# Patient Record
Sex: FEMALE | Race: WHITE | NOT HISPANIC OR LATINO | Employment: UNEMPLOYED | URBAN - METROPOLITAN AREA
[De-identification: names, ages, dates, MRNs, and addresses within clinical notes are randomized per-mention and may not be internally consistent; named-entity substitution may affect disease eponyms.]

---

## 2018-05-16 ENCOUNTER — HOSPITAL ENCOUNTER (INPATIENT)
Facility: HOSPITAL | Age: 78
LOS: 7 days | Discharge: NURSING, REHAB OR LONG-TERM CARE | End: 2018-05-23
Attending: INTERNAL MEDICINE | Admitting: INTERNAL MEDICINE

## 2018-05-16 ENCOUNTER — APPOINTMENT (OUTPATIENT)
Dept: CT IMAGING | Facility: HOSPITAL | Age: 78
End: 2018-05-16

## 2018-05-16 DIAGNOSIS — R53.1 GENERALIZED WEAKNESS: ICD-10-CM

## 2018-05-16 DIAGNOSIS — I62.00 SUBDURAL BLEEDING (HCC): Primary | ICD-10-CM

## 2018-05-16 LAB
ABO GROUP BLD: NORMAL
BLD GP AB SCN SERPL QL: NEGATIVE
GLUCOSE BLDC GLUCOMTR-MCNC: 139 MG/DL (ref 70–130)
RH BLD: POSITIVE
T&S EXPIRATION DATE: NORMAL

## 2018-05-16 PROCEDURE — 82962 GLUCOSE BLOOD TEST: CPT

## 2018-05-16 PROCEDURE — 86901 BLOOD TYPING SEROLOGIC RH(D): CPT | Performed by: NURSE PRACTITIONER

## 2018-05-16 PROCEDURE — 70450 CT HEAD/BRAIN W/O DYE: CPT

## 2018-05-16 PROCEDURE — 86900 BLOOD TYPING SEROLOGIC ABO: CPT | Performed by: NURSE PRACTITIONER

## 2018-05-16 PROCEDURE — 86850 RBC ANTIBODY SCREEN: CPT | Performed by: NURSE PRACTITIONER

## 2018-05-16 PROCEDURE — 99222 1ST HOSP IP/OBS MODERATE 55: CPT | Performed by: PSYCHIATRY & NEUROLOGY

## 2018-05-16 PROCEDURE — 99222 1ST HOSP IP/OBS MODERATE 55: CPT | Performed by: NURSE PRACTITIONER

## 2018-05-16 PROCEDURE — 25010000002 LEVETIRACETAM IN NACL 0.82% 500 MG/100ML SOLUTION: Performed by: PSYCHIATRY & NEUROLOGY

## 2018-05-16 RX ORDER — NICOTINE POLACRILEX 4 MG
15 LOZENGE BUCCAL
Status: DISCONTINUED | OUTPATIENT
Start: 2018-05-16 | End: 2018-05-23 | Stop reason: HOSPADM

## 2018-05-16 RX ORDER — DEXTROSE MONOHYDRATE 25 G/50ML
25 INJECTION, SOLUTION INTRAVENOUS
Status: DISCONTINUED | OUTPATIENT
Start: 2018-05-16 | End: 2018-05-23 | Stop reason: HOSPADM

## 2018-05-16 RX ORDER — DEXMEDETOMIDINE HYDROCHLORIDE 4 UG/ML
.2-1.5 INJECTION, SOLUTION INTRAVENOUS
Status: DISCONTINUED | OUTPATIENT
Start: 2018-05-16 | End: 2018-05-17

## 2018-05-16 RX ORDER — LEVETIRACETAM 5 MG/ML
500 INJECTION INTRAVASCULAR EVERY 12 HOURS SCHEDULED
Status: DISCONTINUED | OUTPATIENT
Start: 2018-05-16 | End: 2018-05-18

## 2018-05-16 RX ORDER — SODIUM CHLORIDE 9 MG/ML
75 INJECTION, SOLUTION INTRAVENOUS CONTINUOUS
Status: DISCONTINUED | OUTPATIENT
Start: 2018-05-16 | End: 2018-05-18

## 2018-05-16 RX ORDER — SODIUM CHLORIDE 0.9 % (FLUSH) 0.9 %
1-10 SYRINGE (ML) INJECTION AS NEEDED
Status: DISCONTINUED | OUTPATIENT
Start: 2018-05-16 | End: 2018-05-23 | Stop reason: HOSPADM

## 2018-05-16 RX ADMIN — DEXMEDETOMIDINE HYDROCHLORIDE 0.2 MCG/KG/HR: 100 INJECTION, SOLUTION INTRAVENOUS at 16:06

## 2018-05-16 RX ADMIN — SODIUM CHLORIDE 75 ML/HR: 9 INJECTION, SOLUTION INTRAVENOUS at 15:10

## 2018-05-16 RX ADMIN — LEVETIRACETAM 500 MG: 5 INJECTION INTRAVENOUS at 15:34

## 2018-05-16 NOTE — CONSULTS
Encounter Date: 5/16/2018    CHIEF COMPLAINT: Altered mental status and fall; CT head in outside hospital shows left subdural hematoma with midline shift and also left occipital hemorrhagic stroke.    There is no problem list on file for this patient.      PRESENT ILLNESS:    Portions of this notes is copied from previous physician encounters, reviewed and edited appropriately.    Background:     Angela Carney is a 78 y.o. female with previous history of dementia living in a nursing home, who had a fall on April 24 and had a normal head CT at the time, now admitted with 1 unwitnessed fall in the nursing home and was admitted to [facility].  The CT head done there showed left subdural hematoma with the moderate midline shift.  Also there was a new left occipital hemorrhage concerning for subacute bleed.  Patient has been transferred here for neurosurgical support, Altered mental status and fall; CT head in outside hospital shows left subdural hematoma with midline shift and also left occipital hemorrhagic stroke.    Patient examined by the bedside: Lethargic, poor historian and does not follow commands.    Review of systems   Cannot review systems because of altered mental status    No past medical history on file.    No past surgical history on file.    No current facility-administered medications for this encounter.        Allergies not on file    Social History     Social History   • Marital status: Single     Spouse name: N/A   • Number of children: N/A   • Years of education: N/A     Occupational History   • Not on file.     Social History Main Topics   • Smoking status: Not on file   • Smokeless tobacco: Not on file   • Alcohol use Not on file   • Drug use: Unknown   • Sexual activity: Not on file     Other Topics Concern   • Not on file     Social History Narrative   • No narrative on file       No family history on file.    No family status information on file.       No current facility-administered  medications on file prior to encounter.      No current outpatient prescriptions on file prior to encounter.           PHYSICAL EXAMINATION:    Vitals: No data found.         General:  pleasant in no acute distress.  HEENT: No pallor or icterus. Neck supple. No Carotid bruits.  Heart: S1 and S2 normal with regular rhythm.  No murmur.       GENERAL NEUROLOGIC EXAM     Mental Status: Lethargic and does not follow commands nonverbal , mumbles incomprehensible sounds for pain   Memory, fund of knowledge, concentration and language cannot be assessed as patient does not participate     Cranial nerves:  pupils 3 mm bilateral no eye deviation no obvious facial asymmetry , most of the cranial could not be examined as patient does not participate    Motor:  withdraws to pain bilaterally upper and lower extremity has antigravity in both upper and lower extremity on either side      Sensation:  withdraws to pain bilaterally upper and lower extremity    Reflexes: 2+ bilaterally symmetrical with down going toes.    Coordination:  and gait cannot tested as patient does not participate      Labs:     No results found for: HGB, HCT, WBC, PLT, EOSPCT, MONOPCT  No results found for: BUN, CREAT, CALCIUM, NA, K, CL, CO2, GLU  No results found for: ALT, AST, BILITOT, BILIDIR  No results found for: PHOS, MG, PROTIME, INR, APTT  No components found for: POCGLUC  No components found for: A1C  No results found for: HDL, LDL  No components found for: B12  No results found for: TSH    Lab Results (last 24 hours)     Procedure Component Value Units Date/Time    POC Glucose Once [[id number]]  (Abnormal) Collected:  05/16/18 1232    Specimen:  Blood Updated:  05/16/18 1254     Glucose 139 (H) mg/dL     Narrative:       Meter: [id number] : [id number] Kemal Cohen RN          .  Imaging Results (last 24 hours)     ** No results found for the last 24 hours. **          For this problem, the following was ordered:  Orders Placed This Encounter    Procedures   • Inpatient Neurosurgery Consult   • Inpatient Nutrition Consult   • Inpatient Case Management  Consult   • POC Glucose Once       Problem List Items Addressed This Visit     None          ASSESSMENT/PLAN: This is a 78 y.o. female who has No past medical history on file. presents with Left subdural hematoma with midline shift, left occipital hemorrhagic stroke.  Patient had dual antiplatelet at home she had a CVA in the past.  She was supposed to get to platelet transfusion in the outside hospital .     1.  Left subdural hematoma with midline shift: Management as per neurosurgery    2.  Left occipital hemorrhagic stroke: Keep blood pressure less than 130/80.  - Platelet transfusion  - I personally spoke with the daughter who is the POA and explained the diagnosis and plan of care.  She lives in [place] and will be on her way when she gets a chance.  As per her wishes patient is full code, and wants aggressive measures including intervention if required.  - Keppra for seizure    3.  Dementia lives in a nursing home.    Will follow.    Thank you for allowing us to participate in the care of this patient.    Nicholas Damon MD  05/16/18  12:58 PM

## 2018-05-16 NOTE — CONSULTS
Patient name: Angela Carney  Referring Provider: Dr. Damon at [facility]  Reason for Consultation: SDH    Patient Care Team:  Ezra Krishnamurthy MD as PCP - General (Internal Medicine)    Chief complaint: fall    Subjective .     History of present illness:    Patient is a 78 y.o.  female who lives in nursing home with history of dementia and stroke. She is on Plavix and ASA. She is unable to give meaningful history. History is obtained from chart and family. She had a fall on 4/24 and was seen at [facility]. ER notes from today indicate no ICH was seen at that time and she returned to facility. She was found down by staff this morning at the facility. Her fall was unwitnessed and it is uncertain how she fell or how long she was down. She is unable to describe her symptoms. Family states that she has just recently been confused. She had a stroke in February. She has had 4 falls in the last 4 weeks per family. Uncertain circumstances of the falls.     Review of Systems  Review of Systems   Unable to perform ROS: Dementia       History  PAST MEDICAL HISTORY  No past medical history on file.    PAST SURGICAL HISTORY  No past surgical history on file.    FAMILY HISTORY  No family history on file.    SOCIAL HISTORY  Social History   Substance Use Topics   • Smoking status: Never Smoker   • Smokeless tobacco: Never Used   • Alcohol use Not on file     not   Lives in Nursing home in [place]      Allergies:  Patient has no allergy information on record.    MEDICATIONS:    Current Facility-Administered Medications:   •  dextrose (D50W) solution 25 g, 25 g, Intravenous, Q15 Min PRN, Charles Sagastume MD  •  dextrose (GLUTOSE) oral gel 15 g, 15 g, Oral, Q15 Min PRN, Charles Sagastume MD  •  glucagon (human recombinant) (GLUCAGEN DIAGNOSTIC) injection 1 mg, 1 mg, Subcutaneous, PRN, Charles Sagastume MD  •  insulin aspart (novoLOG) injection 0-14 Units, 0-14 Units, Subcutaneous, 4x Daily With Meals & Nightly, Charles Sagastume MD  •   levETIRAcetam in NaCl 0.82% (KEPPRA) IVPB 500 mg, 500 mg, Intravenous, Q12H, Nicholas Damon MD  •  sodium chloride 0.9 % flush 1-10 mL, 1-10 mL, Intravenous, PRN, Charles Sagastume MD  •  sodium chloride 0.9 % infusion, 75 mL/hr, Intravenous, Continuous, Charles Sagastume MD    Objective     Results Review:  LABS:    Admission on 05/16/2018   Component Date Value Ref Range Status   • Glucose 05/16/2018 139* 70 - 130 mg/dL Final   • Product Code 05/16/2018 [id number]   Final   • Unit Number 05/16/2018 [id number]   Final   • UNIT  ABO 05/16/2018 A   Final   • UNIT  RH 05/16/2018 POS   Final   • Dispense Status 05/16/2018 XM   Final   • Blood Type 05/16/2018 APOS   Final   • Blood Expiration Date 05/16/2018 [id number]   Final   • Blood Type Barcode 05/16/2018 6200   Final   • ABO Type 05/16/2018 A   Final   • RH type 05/16/2018 Positive   Final   • Antibody Screen 05/16/2018 Negative   Final   • T&S Expiration Date 05/16/2018 5/19/2018 11:59:59 PM   Final     CBC from [facility] shows WBC 13.1, hemoglobin 10.1, hematocrit 30.1, platelets 274.  BMP shows sodium 136 and creatinine 1.0 elevated LFTs, troponin 0.03 INR 0.9 PTT 19.7, urinalysis negative blood nitrites leukocytes    DIAGNOSTICS:  CTH- discussed with Dr. Hodgson, radiologist.  He compared with prior study at [facility] facility.  She has a left-sided frontal parietal subdural hematoma and is heterogeneous and septated likely acute on chronic findings.  Also has left occipital interpretable hemorrhage is 2 cm and left possible subdural hematoma.  She has approximately 1 cm midline shift.  Overall this study is similar as compared to the prior.    CT cervical- DDD with autofusion C6/7 and osteophyte with facet and LF hypertrophy at C5/6 causing at least moderate central stenosis. T1 and T2 mild superior endplate compression deformities.    Results Review:   I reviewed the patient's new clinical results.  I personally viewed and interpreted the  patient's CTs    Vital Signs   Heart Rate:  [81-90] 86  BP: (122-138)/(63-82) 131/73    Physical Exam:  Physical Exam   Constitutional: She appears well-developed and well-nourished.   Body mass index is 27.59 kg/m².     Eyes: EOM are normal. Pupils are equal, round, and reactive to light.   Neck: Neck supple. Normal carotid pulses present. Carotid bruit is not present.   Cardiovascular: Regular rhythm and normal heart sounds.  Tachycardia present.    No murmur heard.  Pulmonary/Chest: Effort normal and breath sounds normal.   Abdominal: Soft. Bowel sounds are normal. There is no hepatosplenomegaly.   Musculoskeletal: She exhibits edema (inessa LE mid calf down).        Cervical back: She exhibits normal range of motion, no tenderness and no bony tenderness.   Neurological: She has normal strength. Finger-nose-finger test: will not cooperate for exam.   Reflex Scores:       Tricep reflexes are 1+ on the right side and 1+ on the left side.       Bicep reflexes are 1+ on the right side and 1+ on the left side.       Brachioradialis reflexes are 1+ on the right side and 1+ on the left side.       Patellar reflexes are 1+ on the right side and 1+ on the left side.  Skin: Skin is warm and dry.   Multiple scattered bruises on face, chest, arms   Psychiatric: Her mood appears anxious. Her speech is tangential. She is agitated. Cognition and memory are impaired. She expresses inappropriate judgment. She is inattentive.   Vitals reviewed.    Neurologic Exam     Mental Status   Oriented to person.   Disoriented to place.   Disoriented to time.   Follows 1 step commands.   Attention: decreased. Concentration: decreased.   Level of consciousness: alert (but keeps eyes closed; )    Cranial Nerves     CN III, IV, VI   Pupils are equal, round, and reactive to light.  Extraocular motions are normal.   CN III: no CN III palsy  CN VI: no CN VI palsy  Nystagmus: none     CN V   Facial sensation intact.     CN VII   Facial expression  full, symmetric.     CN VIII   CN VIII normal.     CN XII   CN XII normal.   Will not cooperate for other CN tests     Motor Exam   Muscle bulk: normal    Strength   Strength 5/5 throughout.     Sensory Exam   Intact to pain throughout extremities     Gait, Coordination, and Reflexes     Gait  Gait: (not tested due to head injury)    Coordination   Finger-nose-finger test: will not cooperate for exam.    Reflexes   Right brachioradialis: 1+  Left brachioradialis: 1+  Right biceps: 1+  Left biceps: 1+  Right triceps: 1+  Left triceps: 1+  Right patellar: 1+  Left patellar: 1+  Right plantar: normal  Left plantar: normal  Right Garcia: absent  Left Garcia: absent  Right ankle clonus: absent  Left ankle clonus: absent      Assessment/Plan     Active Problems:    Subdural bleeding      PLAN: She presents from outlying facility with left sided subdural hematoma significant improvement we'll hemorrhage.  Family states she has had 4 falls in the last 4 weeks.  All are of unknown cause and are reportedly all unwitnessed.  The patient has a history of a stroke in February.  She is on aspirin and Plavix.  These are now on hold and she has been given platelets due to the hemorrhage.  She has underlying dementia and is quite confused on presentation.  She is moving all extremities.  She does follow some commands intermittently.  No facial weakness or unilateral weakness noted.  She is not myelopathic on exam, reflexes are symmetric and 1+ and no Jonathan's or clonus.  She's got some limitation of range of motion but neck is supple and no tenderness to palpation.  CT scan mentions T1 into compression deformities, the patient is not necessarily tender in these areas and there is no way to brace this.  She will remain in ICU for observation with Q1 hour neuro checks tonight.  She's been started on Keppra.  We'll repeat CT scan ahead in the morning.  Dr. Huber has spoken at length with the daughter via telephone.  We'll continue  full care at this time.  No surgical intervention warranted this time as the patient is not obviously symptomatic.    I discussed the patients findings and my recommendations with patient, family, nursing staff and Dr. johanny Salamanca, DANN  05/16/18  3:16 PM

## 2018-05-16 NOTE — H&P
Group: [facility]         H/p NOTE    Patient Identification:  Angela Carney  78 y.o.  female  1940  [id number]            Subdural hematoma with a midline shift    CC:     History of Present Illness:  78-year-old female with history of dementia recently had a fall at the nursing home in April and was discharged back with CT head showing no evidence of any bleed.  She had another fall unwitnessed at a gas station.  CT head workup at that hospital revealed a large subdural hematoma in the left hemisphere with midline shift of 7 mm from left to right.  Patient was transferred to [facility] for further monitoring.  She is sleepy but answers questions in yes or no.  She still kind of drowsy.  Denies any pain or headaches.  There were no other injuries reported in other places.  Apparently patient was on aspirin and Plavix at home as her home medications.  She had been in the nursing home because of dementia.      Review of Systems  Unable to get with the patient's present condition due to patient being lethargic  Past Medical History:  No past medical history on file.  Noted with hypertension, diabetes mellitus, recurrent UTI, old CVA  Past Surgical History:  No past surgical history on file.     Home Meds:  No prescriptions prior to admission.   Reviewed on aspirin, Ativan, fluticasone, Humalog, losartan, Macrobid, metformin, Plavix, risperidone, thiamine and eyedrops    Allergies:  Allergies not on file    Social History:   Social History     Social History   • Marital status: Single     Spouse name: N/A   • Number of children: N/A   • Years of education: N/A     Occupational History   • Not on file.     Social History Main Topics   • Smoking status: Not on file   • Smokeless tobacco: Not on file   • Alcohol use Not on file   • Drug use: Unknown   • Sexual activity: Not on file     Other Topics Concern   • Not on file     Social History Narrative   • No narrative on file       Family History:  No  family history on file.    Physical Exam:  There were no vitals taken for this visit. There is no height or weight on file to calculate BMI.      Physical Exam  Elderly female sleepy and lethargic  Able to protect airway currently  Perioperative bruising noted  Neck is supple no bruit no adenopathy  Chest clear no wheezing or rales  CVS regular rate and rhythm no murmurs or gallop  Abdomen is soft nontender no masses felt  Extremities no edema the sinuses no clubbing  CNS sleepy lethargic no focal neurological deficits currently.  No joint deformities no skin rashes    LABS:  No results found for: CALCIUM, PHOS    No results found for: CKTOTAL, CKMB, CKMBINDEX, TROPONINI, TROPONINT                              No results found for: TSH  CrCl cannot be calculated (No order found.).         Imaging: I personally visualized the images of scans/x-rays performed within last 3 days.      Assessment:  Traumatic subdural hematoma  Status post fall  History of CVA on Plavix and aspirin  Diabetes mellitus  Hypertension  Dementia      Recommendations:  Patient admitted to the ICU with neuro checks  Neurosurgery/neurology following patient  Neuro checks per protocol  IV fluids will be started  Platelets are being given per neurosurgery/neurology  Blood glucose monitoring per ICU protocol  Blood pressure monitoring and keep systolic less than 160  We'll continue to follow with neurosurgery/neurology          Charles Sagastume MD  5/16/2018  1:04 PM      Much of this encounter note is an electronic transcription/translation of spoken language to printed text using Dragon Software.

## 2018-05-16 NOTE — PLAN OF CARE
Problem: Patient Care Overview  Goal: Plan of Care Review  Outcome: Ongoing (interventions implemented as appropriate)  Patient confused throughout shift, waxing and wanning, precedex started for agitation, when turned off patient continues to move extremities and answer some questions appropriately   05/16/18 4780   Coping/Psychosocial   Plan of Care Reviewed With patient   Plan of Care Review   Progress no change   , hemodynamically stable throughout shift

## 2018-05-16 NOTE — PROGRESS NOTES
Discharge Planning Assessment   [facility]     Patient Name: Angela Carney  MRN: [id number]  Today's Date: 5/16/2018    Admit Date: 5/16/2018          Discharge Needs Assessment     Row Name 05/16/18 1528       Living Environment    Lives With facility resident    Name(s) of Who Lives With Patient pt lives at [facility], Memory Care    Current Living Arrangements residential facility    Primary Care Provided by homecare agency    Provides Primary Care For no one, unable/limited ability to care for self    Family Caregiver if Needed none    Quality of Family Relationships supportive    Able to Return to Prior Arrangements yes       Resource/Environmental Concerns    Resource/Environmental Concerns none    Transportation Concerns car, none       Transition Planning    Patient/Family Anticipates Transition to inpatient rehabilitation facility    Patient/Family Anticipated Services at Transition skilled nursing    Transportation Anticipated family or friend will provide       Discharge Needs Assessment    Readmission Within the Last 30 Days no previous admission in last 30 days    Concerns to be Addressed discharge planning    Equipment Currently Used at Home none    Anticipated Changes Related to Illness inability to care for self    Equipment Needed After Discharge walker, rolling    Outpatient/Agency/Support Group Needs homecare agency;assisted living facility    Discharge Facility/Level of Care Needs nursing facility, skilled    Offered/Gave Vendor List yes    Current Discharge Risk dependent with mobility/activities of daily living            Discharge Plan     Row Name 05/16/18 1531       Plan    Plan Undetermined    Patient/Family in Agreement with Plan yes    Plan Comments Spoke with pt's sisters Miya Manley and Amanda Magallanes at bedside.  Introduced self and explained role.  CCP contact information provided.  Face sheet verified and IMM received.  Pt lives in a Memory Care unit at [facility].  She has dementia and is oriented normally to person only.  She is ambulatory but requires assistance with all ADLs.  Call placed to [facility] at [facility] [phone] to verify level of care.  They provide no skilled care at [facility].  Pt's sisters state that pt's daughter Felicity Urbina [phone],  is her POA and she is on her way here from [place] of [place].  CCP will follow up with pt's daughter when she arrives and will follow for skilled needs at discharge.        Destination     No service coordination in this encounter.      Durable Medical Equipment     No service coordination in this encounter.      Dialysis/Infusion     No service coordination in this encounter.      Home Medical Care     No service coordination in this encounter.      Social Care     No service coordination in this encounter.                Demographic Summary     Row Name 05/16/18 1527       General Information    Admission Type inpatient    Arrived From home   [facility], [facility]    Required Notices Provided Important Message from Medicare    Referral Source admission list    Reason for Consult discharge planning    Preferred Language English     Used During This Interaction no       Contact Information    Permission Granted to Share Info With family/designee            Functional Status     Row Name 05/16/18 1528       Functional Status    Usual Activity Tolerance moderate    Current Activity Tolerance poor       Functional Status, IADL    Medications completely dependent    Meal Preparation completely dependent    Housekeeping completely dependent    Laundry completely dependent    Shopping completely dependent       Mental Status    General Appearance WDL WDL       Mental Status Summary    Recent Changes in Mental Status/Cognitive Functioning mental status       Employment/    Employment Status disabled            Psychosocial    No documentation.           Abuse/Neglect    No  documentation.           Legal    No documentation.           Substance Abuse    No documentation.           Patient Forms     Row Name 05/16/18 1535       Patient Forms    Important Message from Medicare ([facility]) Delivered    Delivered to Support person    Method of delivery In person          Sigrid Srinivasan RN

## 2018-05-17 ENCOUNTER — APPOINTMENT (OUTPATIENT)
Dept: CARDIOLOGY | Facility: HOSPITAL | Age: 78
End: 2018-05-17
Attending: INTERNAL MEDICINE

## 2018-05-17 ENCOUNTER — APPOINTMENT (OUTPATIENT)
Dept: CT IMAGING | Facility: HOSPITAL | Age: 78
End: 2018-05-17

## 2018-05-17 PROBLEM — F03.90 DEMENTIA (HCC): Status: ACTIVE | Noted: 2018-05-17

## 2018-05-17 PROBLEM — S06.89AA INTRACRANIAL HEMATOMA (HCC): Status: ACTIVE | Noted: 2018-05-17

## 2018-05-17 PROBLEM — R29.6 FREQUENT FALLS: Status: ACTIVE | Noted: 2018-05-17

## 2018-05-17 LAB
ABO + RH BLD: NORMAL
ALBUMIN SERPL-MCNC: 3.3 G/DL (ref 3.5–5.2)
ALBUMIN/GLOB SERPL: 1.3 G/DL
ALP SERPL-CCNC: 85 U/L (ref 39–117)
ALT SERPL W P-5'-P-CCNC: 70 U/L (ref 1–33)
ANION GAP SERPL CALCULATED.3IONS-SCNC: 13.7 MMOL/L
APTT PPP: 25.7 SECONDS (ref 22.7–35.4)
AST SERPL-CCNC: 35 U/L (ref 1–32)
BH BB BLOOD EXPIRATION DATE: NORMAL
BH BB BLOOD TYPE BARCODE: 6200
BH BB DISPENSE STATUS: NORMAL
BH BB PRODUCT CODE: NORMAL
BH BB UNIT NUMBER: NORMAL
BH CV LOW VAS LEFT GASTRONEMIUS VESSEL: 1
BH CV LOW VAS RIGHT PERONEAL VESSEL: 1
BH CV LOWER VASCULAR LEFT COMMON FEMORAL AUGMENT: NORMAL
BH CV LOWER VASCULAR LEFT COMMON FEMORAL COMPETENT: NORMAL
BH CV LOWER VASCULAR LEFT COMMON FEMORAL COMPRESS: NORMAL
BH CV LOWER VASCULAR LEFT COMMON FEMORAL PHASIC: NORMAL
BH CV LOWER VASCULAR LEFT COMMON FEMORAL SPONT: NORMAL
BH CV LOWER VASCULAR LEFT DISTAL FEMORAL COMPRESS: NORMAL
BH CV LOWER VASCULAR LEFT GASTRONEMIUS COMPRESS: NORMAL
BH CV LOWER VASCULAR LEFT GASTRONEMIUS THROMBUS: NORMAL
BH CV LOWER VASCULAR LEFT GREATER SAPH AK COMPRESS: NORMAL
BH CV LOWER VASCULAR LEFT GREATER SAPH BK COMPRESS: NORMAL
BH CV LOWER VASCULAR LEFT LESSER SAPH COMPRESS: NORMAL
BH CV LOWER VASCULAR LEFT MID FEMORAL AUGMENT: NORMAL
BH CV LOWER VASCULAR LEFT MID FEMORAL COMPETENT: NORMAL
BH CV LOWER VASCULAR LEFT MID FEMORAL COMPRESS: NORMAL
BH CV LOWER VASCULAR LEFT MID FEMORAL PHASIC: NORMAL
BH CV LOWER VASCULAR LEFT MID FEMORAL SPONT: NORMAL
BH CV LOWER VASCULAR LEFT PERONEAL COMPRESS: NORMAL
BH CV LOWER VASCULAR LEFT POPLITEAL AUGMENT: NORMAL
BH CV LOWER VASCULAR LEFT POPLITEAL COMPETENT: NORMAL
BH CV LOWER VASCULAR LEFT POPLITEAL COMPRESS: NORMAL
BH CV LOWER VASCULAR LEFT POPLITEAL PHASIC: NORMAL
BH CV LOWER VASCULAR LEFT POPLITEAL SPONT: NORMAL
BH CV LOWER VASCULAR LEFT POSTERIOR TIBIAL COMPRESS: NORMAL
BH CV LOWER VASCULAR LEFT PROXIMAL FEMORAL COMPRESS: NORMAL
BH CV LOWER VASCULAR LEFT SAPHENOFEMORAL JUNCTION COMPRESS: NORMAL
BH CV LOWER VASCULAR LEFT SAPHENOFEMORAL JUNCTION PHASIC: NORMAL
BH CV LOWER VASCULAR LEFT SAPHENOFEMORAL JUNCTION SPONT: NORMAL
BH CV LOWER VASCULAR RIGHT COMMON FEMORAL AUGMENT: NORMAL
BH CV LOWER VASCULAR RIGHT COMMON FEMORAL COMPETENT: NORMAL
BH CV LOWER VASCULAR RIGHT COMMON FEMORAL COMPRESS: NORMAL
BH CV LOWER VASCULAR RIGHT COMMON FEMORAL PHASIC: NORMAL
BH CV LOWER VASCULAR RIGHT COMMON FEMORAL SPONT: NORMAL
BH CV LOWER VASCULAR RIGHT DISTAL FEMORAL COMPRESS: NORMAL
BH CV LOWER VASCULAR RIGHT GASTRONEMIUS COMPRESS: NORMAL
BH CV LOWER VASCULAR RIGHT GREATER SAPH AK COMPRESS: NORMAL
BH CV LOWER VASCULAR RIGHT GREATER SAPH BK COMPRESS: NORMAL
BH CV LOWER VASCULAR RIGHT LESSER SAPH COMPRESS: NORMAL
BH CV LOWER VASCULAR RIGHT MID FEMORAL AUGMENT: NORMAL
BH CV LOWER VASCULAR RIGHT MID FEMORAL COMPETENT: NORMAL
BH CV LOWER VASCULAR RIGHT MID FEMORAL COMPRESS: NORMAL
BH CV LOWER VASCULAR RIGHT MID FEMORAL PHASIC: NORMAL
BH CV LOWER VASCULAR RIGHT MID FEMORAL SPONT: NORMAL
BH CV LOWER VASCULAR RIGHT PERONEAL COMPRESS: NORMAL
BH CV LOWER VASCULAR RIGHT PERONEAL THROMBUS: NORMAL
BH CV LOWER VASCULAR RIGHT POPLITEAL AUGMENT: NORMAL
BH CV LOWER VASCULAR RIGHT POPLITEAL COMPETENT: NORMAL
BH CV LOWER VASCULAR RIGHT POPLITEAL COMPRESS: NORMAL
BH CV LOWER VASCULAR RIGHT POPLITEAL PHASIC: NORMAL
BH CV LOWER VASCULAR RIGHT POPLITEAL SPONT: NORMAL
BH CV LOWER VASCULAR RIGHT POSTERIOR TIBIAL COMPRESS: NORMAL
BH CV LOWER VASCULAR RIGHT PROXIMAL FEMORAL COMPRESS: NORMAL
BH CV LOWER VASCULAR RIGHT SAPHENOFEMORAL JUNCTION COMPRESS: NORMAL
BH CV LOWER VASCULAR RIGHT SAPHENOFEMORAL JUNCTION PHASIC: NORMAL
BH CV LOWER VASCULAR RIGHT SAPHENOFEMORAL JUNCTION SPONT: NORMAL
BILIRUB SERPL-MCNC: 0.5 MG/DL (ref 0.1–1.2)
BUN BLD-MCNC: 25 MG/DL (ref 8–23)
BUN/CREAT SERPL: 23.4 (ref 7–25)
CALCIUM SPEC-SCNC: 9.1 MG/DL (ref 8.6–10.5)
CHLORIDE SERPL-SCNC: 102 MMOL/L (ref 98–107)
CO2 SERPL-SCNC: 23.3 MMOL/L (ref 22–29)
CREAT BLD-MCNC: 1.07 MG/DL (ref 0.57–1)
DEPRECATED RDW RBC AUTO: 50.2 FL (ref 37–54)
ERYTHROCYTE [DISTWIDTH] IN BLOOD BY AUTOMATED COUNT: 13.3 % (ref 11.7–13)
GFR SERPL CREATININE-BSD FRML MDRD: 50 ML/MIN/1.73
GLOBULIN UR ELPH-MCNC: 2.6 GM/DL
GLUCOSE BLD-MCNC: 178 MG/DL (ref 65–99)
GLUCOSE BLDC GLUCOMTR-MCNC: 135 MG/DL (ref 70–130)
GLUCOSE BLDC GLUCOMTR-MCNC: 160 MG/DL (ref 70–130)
GLUCOSE BLDC GLUCOMTR-MCNC: 177 MG/DL (ref 70–130)
HCT VFR BLD AUTO: 28.8 % (ref 35.6–45.5)
HGB BLD-MCNC: 9.2 G/DL (ref 11.9–15.5)
INR PPP: 1.02 (ref 0.9–1.1)
MCH RBC QN AUTO: 33.3 PG (ref 26.9–32)
MCHC RBC AUTO-ENTMCNC: 31.9 G/DL (ref 32.4–36.3)
MCV RBC AUTO: 104.3 FL (ref 80.5–98.2)
PLATELET # BLD AUTO: 264 10*3/MM3 (ref 140–500)
PMV BLD AUTO: 10.2 FL (ref 6–12)
POTASSIUM BLD-SCNC: 5.3 MMOL/L (ref 3.5–5.2)
PROT SERPL-MCNC: 5.9 G/DL (ref 6–8.5)
PROTHROMBIN TIME: 13.2 SECONDS (ref 11.7–14.2)
RBC # BLD AUTO: 2.76 10*6/MM3 (ref 3.9–5.2)
SODIUM BLD-SCNC: 139 MMOL/L (ref 136–145)
UNIT  ABO: NORMAL
UNIT  RH: NORMAL
WBC NRBC COR # BLD: 6.93 10*3/MM3 (ref 4.5–10.7)

## 2018-05-17 PROCEDURE — 82962 GLUCOSE BLOOD TEST: CPT

## 2018-05-17 PROCEDURE — 85027 COMPLETE CBC AUTOMATED: CPT | Performed by: INTERNAL MEDICINE

## 2018-05-17 PROCEDURE — 85730 THROMBOPLASTIN TIME PARTIAL: CPT | Performed by: INTERNAL MEDICINE

## 2018-05-17 PROCEDURE — 85610 PROTHROMBIN TIME: CPT | Performed by: INTERNAL MEDICINE

## 2018-05-17 PROCEDURE — 92610 EVALUATE SWALLOWING FUNCTION: CPT

## 2018-05-17 PROCEDURE — 97162 PT EVAL MOD COMPLEX 30 MIN: CPT

## 2018-05-17 PROCEDURE — 25010000002 LORAZEPAM PER 2 MG: Performed by: INTERNAL MEDICINE

## 2018-05-17 PROCEDURE — 93970 EXTREMITY STUDY: CPT

## 2018-05-17 PROCEDURE — 99232 SBSQ HOSP IP/OBS MODERATE 35: CPT | Performed by: NURSE PRACTITIONER

## 2018-05-17 PROCEDURE — 70450 CT HEAD/BRAIN W/O DYE: CPT

## 2018-05-17 PROCEDURE — 97110 THERAPEUTIC EXERCISES: CPT

## 2018-05-17 PROCEDURE — 80053 COMPREHEN METABOLIC PANEL: CPT | Performed by: INTERNAL MEDICINE

## 2018-05-17 PROCEDURE — 25010000002 LEVETIRACETAM IN NACL 0.82% 500 MG/100ML SOLUTION: Performed by: PSYCHIATRY & NEUROLOGY

## 2018-05-17 PROCEDURE — 63710000001 INSULIN ASPART PER 5 UNITS: Performed by: INTERNAL MEDICINE

## 2018-05-17 PROCEDURE — 99233 SBSQ HOSP IP/OBS HIGH 50: CPT | Performed by: NURSE PRACTITIONER

## 2018-05-17 RX ORDER — FOLIC ACID 1 MG/1
1 TABLET ORAL DAILY
Status: DISCONTINUED | OUTPATIENT
Start: 2018-05-17 | End: 2018-05-23 | Stop reason: HOSPADM

## 2018-05-17 RX ORDER — RISPERIDONE 1 MG/1
1 TABLET ORAL 3 TIMES DAILY
COMMUNITY
End: 2018-05-23 | Stop reason: HOSPADM

## 2018-05-17 RX ORDER — CLOPIDOGREL BISULFATE 75 MG/1
75 TABLET ORAL DAILY
COMMUNITY
End: 2018-05-23 | Stop reason: HOSPADM

## 2018-05-17 RX ORDER — QUETIAPINE FUMARATE 25 MG/1
25 TABLET, FILM COATED ORAL EVERY 12 HOURS SCHEDULED
Status: DISCONTINUED | OUTPATIENT
Start: 2018-05-17 | End: 2018-05-23 | Stop reason: HOSPADM

## 2018-05-17 RX ORDER — LORAZEPAM 0.5 MG/1
0.5 TABLET ORAL 3 TIMES DAILY
Status: ON HOLD | COMMUNITY
End: 2018-05-23

## 2018-05-17 RX ORDER — THIAMINE MONONITRATE (VIT B1) 100 MG
100 TABLET ORAL 3 TIMES DAILY
COMMUNITY

## 2018-05-17 RX ORDER — DOCUSATE SODIUM 100 MG/1
100 CAPSULE, LIQUID FILLED ORAL 2 TIMES DAILY
COMMUNITY

## 2018-05-17 RX ORDER — TIMOLOL MALEATE 5 MG/ML
1 SOLUTION/ DROPS OPHTHALMIC 2 TIMES DAILY
COMMUNITY

## 2018-05-17 RX ORDER — LOSARTAN POTASSIUM 50 MG/1
50 TABLET ORAL DAILY
COMMUNITY

## 2018-05-17 RX ORDER — LATANOPROST 50 UG/ML
1 SOLUTION/ DROPS OPHTHALMIC NIGHTLY
COMMUNITY

## 2018-05-17 RX ORDER — CRANBERRY FRUIT EXTRACT 250 MG
1 CAPSULE ORAL DAILY
COMMUNITY

## 2018-05-17 RX ORDER — FOLIC ACID 1 MG/1
1 TABLET ORAL DAILY
COMMUNITY

## 2018-05-17 RX ORDER — ASPIRIN 81 MG/1
81 TABLET, CHEWABLE ORAL DAILY
COMMUNITY
End: 2018-05-23 | Stop reason: HOSPADM

## 2018-05-17 RX ORDER — INSULIN GLARGINE 100 [IU]/ML
10 INJECTION, SOLUTION SUBCUTANEOUS
COMMUNITY

## 2018-05-17 RX ORDER — NITROFURANTOIN 25; 75 MG/1; MG/1
100 CAPSULE ORAL 2 TIMES DAILY
COMMUNITY
Start: 2018-05-14 | End: 2018-05-23 | Stop reason: HOSPADM

## 2018-05-17 RX ORDER — INSULIN GLARGINE 100 [IU]/ML
20 INJECTION, SOLUTION SUBCUTANEOUS EVERY MORNING
COMMUNITY

## 2018-05-17 RX ORDER — LORAZEPAM 2 MG/ML
1 INJECTION INTRAMUSCULAR EVERY 4 HOURS PRN
Status: DISCONTINUED | OUTPATIENT
Start: 2018-05-17 | End: 2018-05-23 | Stop reason: HOSPADM

## 2018-05-17 RX ORDER — LABETALOL HYDROCHLORIDE 5 MG/ML
20 INJECTION, SOLUTION INTRAVENOUS EVERY 8 HOURS PRN
Status: DISCONTINUED | OUTPATIENT
Start: 2018-05-17 | End: 2018-05-23 | Stop reason: HOSPADM

## 2018-05-17 RX ORDER — AMMONIUM LACTATE 120 MG/G
1 CREAM TOPICAL 2 TIMES DAILY
COMMUNITY

## 2018-05-17 RX ORDER — POLYETHYLENE GLYCOL 3350 17 G/17G
17 POWDER, FOR SOLUTION ORAL DAILY
COMMUNITY

## 2018-05-17 RX ADMIN — LEVETIRACETAM 500 MG: 5 INJECTION INTRAVENOUS at 22:18

## 2018-05-17 RX ADMIN — LORAZEPAM 1 MG: 2 INJECTION INTRAMUSCULAR; INTRAVENOUS at 14:46

## 2018-05-17 RX ADMIN — LEVETIRACETAM 500 MG: 5 INJECTION INTRAVENOUS at 08:36

## 2018-05-17 RX ADMIN — INSULIN ASPART 3 UNITS: 100 INJECTION, SOLUTION INTRAVENOUS; SUBCUTANEOUS at 08:36

## 2018-05-17 NOTE — NURSING NOTE
Attempted to page Dr Echeverria through service and overhead to report patient positive for calf andreas thrombosis and to request PRN for BP. Unable to obtain response. Passed on need for BP PRN and report of calf vein thrombosis to Rosanna ([facility] RN).

## 2018-05-17 NOTE — PLAN OF CARE
Problem: Patient Care Overview  Goal: Plan of Care Review   05/17/18 2914   Coping/Psychosocial   Plan of Care Reviewed With patient;family   OTHER   Outcome Summary bedside swallow eval completed- pt displayed clinical s/s aspiration with thin, thickened, and puree consistencies- cognition neg impacts safety; Rec: NPO, reassess at bedside tomorrow

## 2018-05-17 NOTE — PROGRESS NOTES
"DOS: 2018  NAME: Angela Carney   : 1940  PCP: Ezra Krishnamurthy MD  No chief complaint on file.    Stroke    Historian: Chart and nursing     Subjective: No events overnight. Nursing reports that patient was restless yesterday and precedex was started.  Nursing is currently weaning. Per nursing neuro exams have been challenging d/t baseline dementia and patients willingess to participate.     Objective:  Vital signs: /69   Pulse 58   Temp 97.7 °F (36.5 °C)   Ht 162.6 cm (64.02\")   Wt 72.9 kg (160 lb 11.5 oz)   SpO2 97%   BMI 27.57 kg/m²       HEENT: Normocephalic, atraumatic   COR: RRR  Resp: Even and unlabored  Extremities: Equal pulses, non distal embolization    Neurological: exam limited by patients willingess to participate   MS: Arouseable.  Language appear normal. No obv. neglect. Will not follow any commands   CN: II-XII normal most of the cranial could not be examined as patient does not; no obv. Facial asymmetry. participate    Motor: Moves all 4 ext. Equally   Sensory: Unable   Coordination: Unable   Drips: Precedex     Laboratory results:  Lab Results   Component Value Date    GLUCOSE 178 (H) 2018    CALCIUM 9.1 2018     2018    K 5.3 (H) 2018    CO2 23.3 2018     2018    BUN 25 (H) 2018    CREATININE 1.07 (H) 2018    EGFRIFNONA 50 (L) 2018    BCR 23.4 2018    ANIONGAP 13.7 2018     Lab Results   Component Value Date    WBC 6.93 2018    HGB 9.2 (L) 2018    HCT 28.8 (L) 2018    .3 (H) 2018     2018         Review and interpretation of imagin2018 CT Head   IMPRESSION:  Stable appearance of multiple areas of hemorrhage including  a broad heterogeneous septated or multicompartmental subdural overlying  the left cerebral hemisphere, a smaller subdural extending inferior to  the left occipital lobe along the tentorium cerebelli, and  intraparenchymal " hemorrhage involving the left occipital lobe medially  as well as areas of subarachnoid hemorrhage involving the sylvian  fissure on the left involving the central sulcus superiorly.    05/17/2018  CT Head   IMPRESSION:  1. The very large acute left subdural has diminished fairly  significantly in size with an associated significant improvement in mass  effect including reduction of midline shift now 3 mm.  2. Other findings are stable.          Impression: This is a 77 yo female w/ history of dementia living in a nursing home w/ report of multiple falls over he past few weeks who presented to [facility] after an unwitnessed fall. Imaging at [facility] revealed left subdural hematoma with the moderate midline shift.  as well as a new left occipital hemorrhage concerning for subacute bleed and patient was subsequently transferred to [facility] for neurosurgical support.  Repeat Imaging today revealed very large acute left subdural which has improved mass effect and reduction in midline shift when compared to 5/16 imaging. Precedex infusing d/t agitation; currently being weaned Patient is demented at baseline and not willing to participate in my exam. Per Dr. Damon's note yesterday patient appears unchanged minus the fact that she is more alert. No change in current plan will will continue to monitor progress. PT/OT/ST. CCP to assit with discharge planning.     Plan:  · Platelet transfusion (conmplete)   · Keppra 500mg q 12hrs  · Wean sedation as tolerated   · BP parameters per Neurosurgery recommendations   · Q1 hour Neurochecks     Case reviewed w/ Dr. Damon and he agrees with plan above.

## 2018-05-17 NOTE — PROGRESS NOTES
Continued Stay Note   [facility]     Patient Name: Angela Carney  MRN: [id number]  Today's Date: 5/17/2018    Admit Date: 5/16/2018          Discharge Plan     Row Name 05/17/18 0582       Plan    Plan SNF    Plan Comments Spoke again with pt's daughter Felicity and her  Fred at bedside.  Questions answered concerning insurance, skilled care, long term care and Medicaid.  They requested CCP speak with Emily Roth with [facility] [phone].  They have been working toward applying for a Medicaid Waiver for pt to help cover the cost of assisted living.  CCP called to confirm that the Waiver will not be helpful if pt goes to skilled care.  Updated family.  Plan continues to be SNF for rehab converting to long term care and Medicaid application.  Family is currently working with an [employer]  to assist with ongoing Medicaid application.  Ismael Cornejo for [employer] will speak with family regarding her facilities and pay sources.  Family is going to tour the two selected facilities at this time.    Row Name 05/17/18 1440       Plan    Plan Comments Requested copy of POA paperwork from pt's dtr.  She states it is for financial and healthcare.  She will bring a copy for the chart.              Discharge Codes    No documentation.           Sigrid Srinivasan RN

## 2018-05-17 NOTE — PROGRESS NOTES
Vascular lab bilateral lower extremity venous doppler complete, prelim new calf vein thrombosis bilaterally without extension to popliteal vein, RAMON Sheridan aware

## 2018-05-17 NOTE — PROGRESS NOTES
"  [facility] PROGRESS [facility]    PATIENT IDENTIFICATION:   Name:  Angela Carney      MRN:  [id number]     78 y.o.  female               CC:SDH      Subjective     Interval History: Since last encounter, patient has not had new complaints. She has been on Precedex overnight but currently off. No neuro changes. Uncooperative at times. Long discussion with daughter- more information regarding history. She was living at home and caring for self last fall with subtle signs of memory changes. Abruptly different in February. She was sent to [facility] for \"neurological evaluation\" by her PCP and had a CT at PCP office. They were told she had a stroke. They are unaware of any further work up. She was on Plavix in past but off for several years. She was prescribed Eliquis by PCP but family uncertain why- unaware of any cardiac issues except MVP. She was not taking medications as directed. She was hospitalized due to fall at home in March. She was at [facility]. She was then placed in memory care living situation due to her dementia. She has been moved twice due to family's displeasure with facility. She has had 4 other unwitnessed falls. They do not feel that she should return to the current living situation as she likely cannot sustain the required self care.     ROS:  RAMANA- uncooperative and confused    Objective     Vital signs in last 24 hours:  Temp:  [97.7 °F (36.5 °C)-98.9 °F (37.2 °C)] 97.7 °F (36.5 °C)  Heart Rate:  [] 63  BP: ()/(50-91) 134/91      Intake/Output this shift:  No intake/output data recorded.      Intake/Output last 3 shifts:  I/O last 3 completed shifts:  In: 1335.4 [I.V.:1335.4]  Out: -     LABS:    Results from last 7 days  Lab Units 05/17/18  0550   WBC 10*3/mm3 6.93   HEMOGLOBIN g/dL 9.2*   HEMATOCRIT % 28.8*   PLATELETS 10*3/mm3 264         Results from last 7 days  Lab Units 05/17/18  0550   SODIUM mmol/L 139   POTASSIUM mmol/L 5.3*   CHLORIDE mmol/L 102   CO2 mmol/L 23.3 "   BUN mg/dL 25*   CREATININE mg/dL 1.07*   CALCIUM mg/dL 9.1   BILIRUBIN mg/dL 0.5   ALK PHOS U/L 85   ALT (SGPT) U/L 70*   AST (SGOT) U/L 35*   GLUCOSE mg/dL 178*        IMAGING STUDIES:  CTH- significant improvement in left SDH and MLS. No change in SAH and IPH    I personally viewed and interpreted the patient's cTH.    Meds reviewed/changed: Yes    Current Facility-Administered Medications:   •  dexmedetomidine (PRECEDEX) 400 mcg/100 mL (4 mcg/mL) infusion, 0.2-1.5 mcg/kg/hr, Intravenous, Titrated, Charles Sagastume MD, Stopped at 05/17/18 0934  •  dextrose (D50W) solution 25 g, 25 g, Intravenous, Q15 Min PRN, Charles Sagastume MD  •  dextrose (GLUTOSE) oral gel 15 g, 15 g, Oral, Q15 Min PRN, Charles Sagastume MD  •  glucagon (human recombinant) (GLUCAGEN DIAGNOSTIC) injection 1 mg, 1 mg, Subcutaneous, PRN, Charles Sagastume MD  •  insulin aspart (novoLOG) injection 0-14 Units, 0-14 Units, Subcutaneous, 4x Daily With Meals & Nightly, Charles Sagastume MD, 3 Units at 05/17/18 0836  •  levETIRAcetam in NaCl 0.82% (KEPPRA) IVPB 500 mg, 500 mg, Intravenous, Q12H, Nicholas Damon MD, 500 mg at 05/17/18 0836  •  QUEtiapine (SEROquel) tablet 25 mg, 25 mg, Oral, Q12H, Charles Sagastume MD  •  sodium chloride 0.9 % flush 1-10 mL, 1-10 mL, Intravenous, PRN, Charles Sagastume MD  •  sodium chloride 0.9 % infusion, 75 mL/hr, Intravenous, Continuous, Charles Sagastume MD, Last Rate: 75 mL/hr at 05/16/18 1510, 75 mL/hr at 05/16/18 1510      Physical Exam:    General:   Resting but vocalizes when spoken to. Did briefly open eyes     spontaneously but will not follow any commands. Her speech     is clear but tangential  HEENT:    Facial bruising  Neck:    supple    CN III IV VI: Patient will not cooperate for exam  CN V: Normal facial sensation   CN VII: No obvious weakness      Motor: DUMONT spontaneously and to pain- localizes   Sensation: Normal to painful stimulus  Extremities: inessa LE edema    Assessment/Plan     ASSESSMENT:    Active Problems:    " Subdural bleeding    Intracranial hematoma    Frequent falls    Dementia      PLAN: CTH improved. Patient uncooperative but verbalizes and DUMONT equally. Family gave more information regarding recent events. I discussed with Fadia Whyte the uncertainty of the extent of \"stroke\" work up in February. Continue to hold ASA and Plavix at present. She has inessa LE edema and poor mobility- check doppler before placing SCD. OK for diet after SLP eval, TTF, and therapy consults.     I discussed the patients findings and my recommendations with family and nursing staff       LOS: 1 day       Dionne Salamanca, APRN  5/17/2018  10:29 AM    "

## 2018-05-17 NOTE — DISCHARGE PLACEMENT REQUEST
"Angela Carney (78 y.o. Female)     Date of Birth Social Security Number Address Home Phone MRN    1940  [id number] [place] [address] [phone] [id number]    Latter day Marital Status          None Single       Admission Date Admission Type Admitting Provider Attending Provider Department, Room/Bed    5/16/18 Urgent Charles Sagastume MD Sayied, Tausif, MD [facility] INTENSIVE CARE, 373/1    Discharge Date Discharge Disposition Discharge Destination                       Attending Provider:  Charles Sagastume MD    Allergies:  No Known Allergies    Isolation:  None   Infection:  None   Code Status:  FULL    Ht:  162.6 cm (64\")   Wt:  72.9 kg (160 lb 11.5 oz)    Admission Cmt:  BC MEDICARE REPL   Principal Problem:  None                Active Insurance as of 5/16/2018     Primary Coverage     Payor Plan Insurance Group Employer/Plan Group    ANTHEM MEDICARE REPLACEMENT ANTHEM MEDICARE ADVANTAGE [id number]     Payor Plan Address Payor Plan Phone Number Effective From Effective To    [address] [phone] 1/1/2017     [facility] [address]       Subscriber Name Subscriber Birth Date Member ID       ANGELA CARNEY 1940 [id number]                 Emergency Contacts      (Rel.) Home Phone Work Phone Mobile Phone    Carlitos(Daughter)Felicity (Power of ) -- -- [phone]    Pete Carney (Son) -- -- [phone]    Miya Manley (Sister) [phone] -- [phone]    Amanda Magallanes (Sister) -- -- [phone]              "

## 2018-05-17 NOTE — SIGNIFICANT NOTE
05/17/18 1119   Rehab Time/Intention   Evaluation Not Performed other (see comments)  (RN requested to try to see pt in pm. Pt lethargic and needing to rest)   Rehab Treatment   Discipline occupational therapist   Recommendation   OT - Next Appointment 05/18/18

## 2018-05-17 NOTE — PROGRESS NOTES
Continued Stay Note   [facility]     Patient Name: Angela Carney  MRN: [id number]  Today's Date: 5/17/2018    Admit Date: 5/16/2018          Discharge Plan     Row Name 05/17/18 7634       Plan    Plan Comments Requested copy of POA paperwork from pt's dtr.  She states it is for financial and healthcare.  She will bring a copy for the chart.    Row Name 05/17/18 2983       Plan    Plan SNF  -  Referrals called    Patient/Family in Agreement with Plan yes    Plan Comments Spoke with Felicity Urbina, pt's daughter and POA at bedside.  Pt will need a higher level of care at discharge.  Discussed possible options for skilled care.  Gave pt's daughter resources including the Medicare.gov website listing.  She would like referrals to [facility] in [place] and [facility] in [place].  Call placed to Ismael Cornejo for [facility] and gave referrals.  She will begin workup.              Discharge Codes    No documentation.           Sigrid Srinivasan RN

## 2018-05-17 NOTE — PLAN OF CARE
Problem: Patient Care Overview  Goal: Plan of Care Review  Outcome: Ongoing (interventions implemented as appropriate)   05/17/18 1311   Coping/Psychosocial   Plan of Care Reviewed With patient   OTHER   Outcome Summary Pt presents with impaired functional mobility and gait secondary to generalized weakness, impaired balance, and decreased activity tolerance s/p SDh and fall. Pt may benefit from skilled PT to address strength, mobility, and gait.

## 2018-05-17 NOTE — PLAN OF CARE
Problem: Fall Risk (Adult)  Goal: Identify Related Risk Factors and Signs and Symptoms   05/17/18 1707   Fall Risk (Adult)   Related Risk Factors (Fall Risk) age-related changes   Signs and Symptoms (Fall Risk) presence of risk factors     Goal: Absence of Fall   05/17/18 1707   Fall Risk (Adult)   Absence of Fall making progress toward outcome       Problem: Skin Injury Risk (Adult)  Goal: Identify Related Risk Factors and Signs and Symptoms   05/17/18 1707   Skin Injury Risk (Adult)   Related Risk Factors (Skin Injury Risk) cognitive impairment;critical care admission;mobility impaired     Goal: Skin Health and Integrity   05/17/18 1707   Skin Injury Risk (Adult)   Skin Health and Integrity making progress toward outcome       Problem: Patient Care Overview  Goal: Plan of Care Review   05/17/18 1707   Coping/Psychosocial   Plan of Care Reviewed With patient   Plan of Care Review   Progress  05/17/18 1707   Coping/Psychosocial   Plan of Care Reviewed With patient   Plan of Care Review   Progress improving   OTHER   Outcome Summary Pt overflow to [facility]park. Weaned off of Precedex early in shift. Pt restless and agitated much of the time. Attempts at reorienting, soothing, calming and medicating were met with limited success. Ultimately, pt was placed back in restraints to ensure IV access, and ability to monitor vital signs and intake/output. Neuro status otherwise stable. ST to reeval tomorrow.   improving       Problem: Intracranial Hemorrhage (NICU)  Goal: Signs and Symptoms of Listed Potential Problems Will be Absent, Minimized or Managed (Intracranial Hemorrhage)   05/17/18 1707   Goal/Outcome Evaluation   Problems Assessed (Intracranial Hemorrhage) all   Problems Present (Intracranial Hemorrh) neurologic deficit       Problem: Restraint, Nonbehavioral (Nonviolent)  Goal: Rationale and Justification   05/17/18 1707   Restraint, Nonbehavioral (Nonviolent)   Rationale and Justification prevent harm to self;prevent  line/tube removal;failure of less restrictive safety measures     Goal: Nonbehavioral (Nonviolent) Restraint: Absence of Injury/Harm   05/17/18 1707   Restraint, Nonbehavioral (Nonviolent)   Nonbehavioral (Nonviolent) Restraint: Absence of Injury/Harm met     Goal: Nonbehavioral (Nonviolent) Restraint: Achievement of Discontinuation Criteria   05/17/18 1707   Restraint, Nonbehavioral (Nonviolent)   Nonbehavioral (Nonviolent) Restraint: Achievement of Discontinuation Criteria not met     Goal: Nonbehavioral (Nonviolent) Restraint: Preservation of Dignity and Wellbeing   05/17/18 1707   Restraint, Nonbehavioral (Nonviolent)   Nonbehavioral (Nonviolent) Restraint: Preservation of Dignity and Wellbeing met

## 2018-05-17 NOTE — PROGRESS NOTES
"[facility] PULMONARY CARE         Dr Soto Sayied   LOS: 1 day   Patient Care Team:  Ezra Krishnamurthy MD as PCP - General (Internal Medicine)    Chief Complaint: Traumatic subdural hematoma with history of CVA on Plavix and aspirin    Interval History: Events noted chart reviewed.  Discussed with nursing staff.  She is currently on Precedex due to agitation.  Wakes up moans and groans and goes back to sleep.    REVIEW OF SYSTEMS:   Confused and not reliable    Ventilator/Non-Invasive Ventilation Settings     None            Vital Signs  Temp:  [97.7 °F (36.5 °C)-98.9 °F (37.2 °C)] 97.7 °F (36.5 °C)  Heart Rate:  [] 58  BP: ()/(50-89) 122/69    Intake/Output Summary (Last 24 hours) at 05/17/18 0838  Last data filed at 05/17/18 0700   Gross per 24 hour   Intake           1335.4 ml   Output                0 ml   Net           1335.4 ml     Flowsheet Rows      First Filed Value   Admission Height  162.6 cm (64\") Documented at 05/16/2018 1535   Admission Weight  72.9 kg (160 lb 11.5 oz) Documented at 05/16/2018 1535          Physical Exam:   General Appearance:    Sleepy and confused.  Wakes up and following some commands    Lungs:     Clear to auscultation,respirations regular, even and                  unlabored    Heart:    Regular rhythm and normal rate, normal S1 and S2, no            murmur, no gallop, no rub, no click   Chest Wall:    No abnormalities observed   Abdomen:     Normal bowel sounds, no masses, no organomegaly, soft        non-tender, non-distended, no guarding, no rebound                tenderness   Extremities:   Moves all extremities well, no edema, no cyanosis, no             redness     Results Review:          Results from last 7 days  Lab Units 05/17/18  0550   SODIUM mmol/L 139   POTASSIUM mmol/L 5.3*   CHLORIDE mmol/L 102   CO2 mmol/L 23.3   BUN mg/dL 25*   CREATININE mg/dL 1.07*   GLUCOSE mg/dL 178*   CALCIUM mg/dL 9.1           Results from last 7 days  Lab Units " 05/17/18  0550   WBC 10*3/mm3 6.93   HEMOGLOBIN g/dL 9.2*   HEMATOCRIT % 28.8*   PLATELETS 10*3/mm3 264                           I reviewed the patient's new clinical results.  I personally viewed and interpreted the patient's CXR        Medication Review:     insulin aspart 0-14 Units Subcutaneous 4x Daily With Meals & Nightly   levETIRAcetam 500 mg Intravenous Q12H         dexmedetomidine 0.2-1.5 mcg/kg/hr Last Rate: 0.2 mcg/kg/hr (05/17/18 0825)   sodium chloride 75 mL/hr Last Rate: 75 mL/hr (05/16/18 1510)       ASSESSMENT:   Traumatic subdural hematoma  Status post fall  Altered mental status  History of CVA on Plavix and aspirin  Diabetes mellitus  Elevated LFTs  Hypertension  Dementia    PLAN:  Neurochecks remained stable  Wean off Precedex drip as tolerated  I believe altered mental status due to above plus part of dementia  We'll start patient on some Seroquel  Patient did receive platelets yesterday  We will trend LFTs  Blood pressure monitoring keep systolic less than 160  ICU core measures  Once more awake and no surgical intervention per neurosurgery plans for diet post swallow eval  Discussed plan of care with the rounding team and nursing staff    Charles Sagastume MD  05/17/18  8:38 AM

## 2018-05-17 NOTE — PROGRESS NOTES
Continued Stay Note   [facility]     Patient Name: Angela Carney  MRN: [id number]  Today's Date: 5/17/2018    Admit Date: 5/16/2018          Discharge Plan     Row Name 05/17/18 1058       Plan    Plan SNF  -  Referrals called    Patient/Family in Agreement with Plan yes    Plan Comments Spoke with Felicity Urbina, pt's daughter and POA at bedside.  Pt will need a higher level of care at discharge.  Discussed possible options for skilled care.  Gave pt's daughter resources including the Medicare.gov website listing.  She would like referrals to [facility] in [place] and [facility] in [place].  Call placed to Ismael Cornejo for [facility] and gave referrals.  She will begin workup.              Discharge Codes    No documentation.           Sigrid Srinivasan RN

## 2018-05-17 NOTE — THERAPY EVALUATION
Acute Care - Speech Language Pathology   Swallow Initial Evaluation  [facility]     Patient Name: Angela Carney  : 1940  MRN: [id number]  Today's Date: 2018  Onset of Illness/Injury or Date of Surgery: 18            Admit Date: 2018    Visit Dx:     ICD-10-CM ICD-9-CM   1. Subdural bleeding I62.00 432.1   2. Generalized weakness R53.1 780.79     Patient Active Problem List   Diagnosis   • Subdural bleeding   • Intracranial hematoma   • Frequent falls   • Dementia     Past Medical History:   Diagnosis Date   • Dementia    • Diabetes mellitus    • Frequent UTI    • Stroke      History reviewed. No pertinent surgical history.       SWALLOW EVALUATION (last 72 hours)      SLP Adult Swallow Evaluation     Row Name 18 1140                   Rehab Evaluation    Document Type evaluation  -SL        Subjective Information no complaints  -SL        Patient Observations alert   confused  -SL        Patient/Family Observations pt talking incessantly- incoherent; very confused; could not follow any commands  -SL        Patient Effort poor  -SL        Comment confusion limits ability to participate  -SL        Symptoms Noted During/After Treatment none  -SL           General Information    Patient Profile Reviewed yes  -SL        Pertinent History Of Current Problem Admitted with falls, large SDH - L hemisphere, with midline shift, and L occipital hemorrhage stroke; Hx- dementia- Nursing home resident  -SL        Current Method of Nutrition NPO  -SL        Precautions/Limitations, Hearing WFL  -SL        Prior Level of Function-Communication cognitive-linguistic impairment   memory impairment- old CVA  -SL        Prior Level of Function-Swallowing no diet consistency restrictions  -SL        Plans/Goals Discussed with patient;family  -SL        Barriers to Rehab medically complex;previous functional deficit;cognitive status;uncooperative  -SL        Patient's Goals for Discharge patient did not  state;patient could not state  -        Family Goals for Discharge patient able to return to PO diet  -           Oral Motor and Function    Dentition Assessment natural, present and adequate  -        Secretion Management WNL/WFL  -        Mucosal Quality moist, healthy  -        Volitional Swallow delayed;weak  -SL        Volitional Cough unable to elicit  -           Oral Musculature and Cranial Nerve Assessment    Oral Motor General Assessment unable to assess  -SL        Oral Motor, Comment pt could not follow any verbal commands to compelte oral motor mvmts; voice was weak- pt could not throat clear/cough on command  -           Clinical Swallow Eval    Oral Prep Phase impaired  -SL        Oral Transit impaired  -SL        Oral Residue impaired  -SL        Pharyngeal Phase suspected pharyngeal impairment  -        Clinical Swallow Evaluation Summary Pt was resistant to placement of spon/cup- would not close lips on spoon or cup; wet voice after ice ship trials- never cleared; anterior spillage and wet voice noted after thin liquid on spoon or from cup, nectar thick liquids; poor bolus control and a-p mvmt with honey thick liq, and puree- pt resistant to taking , holdingteeth tight together- swallow triggered after delay, however laryngeal elevation appeared weak/reduced; wet voice noted with all  consistencies presented; pt talking throughout eval- placing her more at risk for aspiration as well; pt is not appropriate for po at this time due to overall weakness, swallow delay, and cognitive impairment  -           Clinical Impression    SLP Swallowing Diagnosis mod-severe;oral dysfunction;suspected pharyngeal dysfunction  -        Functional Impact risk of aspiration/pneumonia  -        Rehab Potential/Prognosis, Swallowing adequate, monitor progress closely  -        Criteria for Skilled Therapeutic Interventions Met demonstrates skilled criteria  -           Recommendations     Therapy Frequency (Swallow) PRN  -SL        Predicted Duration Therapy Intervention (Days) until discharge  -        SLP Diet Recommendation NPO  -        Recommended Diagnostics reassess via clinical swallow evaluation  -        SLP Rec. for Method of Medication Administration meds via alternate route  -        Monitor for Signs of Aspiration yes;notify SLP if any concerns  -        Anticipated Dischage Disposition unknown  -          User Key  (r) = Recorded By, (t) = Taken By, (c) = Cosigned By    Initials Name Effective Dates     Krystal Pickard MS CCC-SLP 04/13/15 -         EDUCATION  The patient has been educated in the following areas:   Dysphagia (Swallowing Impairment).    SLP Recommendation and Plan  SLP Swallowing Diagnosis: mod-severe, oral dysfunction, suspected pharyngeal dysfunction  SLP Diet Recommendation: NPO        Monitor for Signs of Aspiration: yes, notify SLP if any concerns  Recommended Diagnostics: reassess via clinical swallow evaluation  Criteria for Skilled Therapeutic Interventions Met: demonstrates skilled criteria  Anticipated Dischage Disposition: unknown  Rehab Potential/Prognosis, Swallowing: adequate, monitor progress closely  Therapy Frequency (Swallow): PRN  Predicted Duration Therapy Intervention (Days): until discharge       Plan of Care Reviewed With: patient, family  Plan of Care Review  Plan of Care Reviewed With: patient, family  Outcome Summary: bedside swallow eval completed- pt displayed clinical s/s aspiration with thin, thickened, and puree consistencies- cognition neg impacts safety; Rec:  NPO, reassess at bedside tomorrow           SLP Outcome Measures (last 72 hours)      SLP Outcome Measures     Row Name 05/17/18 1300             SLP Outcome Measures    Outcome Measure Used? Adult NOMS  -         FCM Scores    Swallowing FCM Score 1  -        User Key  (r) = Recorded By, (t) = Taken By, (c) = Cosigned By    Initials Name Effective Dates     Krystal  MS RUSTY Pickard-SLP 04/13/15 -            Time Calculation:         Time Calculation- SLP     Row Name 05/17/18 1338             Time Calculation- SLP    SLP Received On 05/17/18  -        User Key  (r) = Recorded By, (t) = Taken By, (c) = Cosigned By    Initials Name Provider Type    FLOYD Pickard MS CCC-SLP Speech and Language Pathologist          Therapy Charges for Today     Code Description Service Date Service Provider Modifiers Qty    25892440411 HC ST EVAL ORAL PHARYNG SWALLOW 4 5/17/2018 Krystal Pickard MS CCC-SLP GN 1               Krystal Pickard MS CCC-SLP  5/17/2018

## 2018-05-17 NOTE — THERAPY TREATMENT NOTE
Acute Care - Physical Therapy Treatment Note   [facility]     Patient Name: Angela Carney  : 1940  MRN: [id number]  Today's Date: 2018  Onset of Illness/Injury or Date of Surgery: 18          Admit Date: 2018    Visit Dx:    ICD-10-CM ICD-9-CM   1. Subdural bleeding I62.00 432.1   2. Generalized weakness R53.1 780.79     Patient Active Problem List   Diagnosis   • Subdural bleeding   • Intracranial hematoma   • Frequent falls   • Dementia       Therapy Treatment        Wound 18 1230 sacral spine pressure injury (Active)   Dressing Appearance open to air 2018 12:05 PM   Base pink 2018 12:05 PM   Periwound excoriated 2018 12:05 PM   Drainage Amount none 2018 12:05 PM   Care, Wound cleansed with 2018 12:05 PM   Dressing Care, Wound open to air 2018  7:45 AM               PT Rehab Goals     Row Name 18 1039             Bed Mobility Goal 1 (PT)    Activity/Assistive Device (Bed Mobility Goal 1, PT) bed mobility activities, all  -CH      Kane Level/Cues Needed (Bed Mobility Goal 1, PT) minimum assist (75% or more patient effort)  -CH      Time Frame (Bed Mobility Goal 1, PT) 1 week  -CH         Transfer Goal 1 (PT)    Activity/Assistive Device (Transfer Goal 1, PT) transfers, all  -CH      Kane Level/Cues Needed (Transfer Goal 1, PT) minimum assist (75% or more patient effort)  -CH      Time Frame (Transfer Goal 1, PT) 1 week  -CH         Gait Training Goal 1 (PT)    Activity/Assistive Device (Gait Training Goal 1, PT) gait (walking locomotion)  -CH      Kane Level (Gait Training Goal 1, PT) minimum assist (75% or more patient effort)  -CH      Distance (Gait Goal 1, PT) 50  -CH      Time Frame (Gait Training Goal 1, PT) 1 day  -        User Key  (r) = Recorded By, (t) = Taken By, (c) = Cosigned By    Initials Name Provider Type Discipline     Andressa Musa, PT Physical Therapist PT          Physical Therapy Education      Title: PT OT SLP Therapies (Done)     Topic: Physical Therapy (Done)     Point: Mobility training (Done)    Learning Progress Summary     Learner Status Readiness Method Response Comment Documented by    Patient Done Acceptance E,TB,D VU,NR   05/17/18 1311          Point: Home exercise program (Done)    Learning Progress Summary     Learner Status Readiness Method Response Comment Documented by    Patient Done Acceptance E,TB,D VU,NR   05/17/18 1311          Point: Body mechanics (Done)    Learning Progress Summary     Learner Status Readiness Method Response Comment Documented by    Patient Done Acceptance E,TB,D VU,NR   05/17/18 1311          Point: Precautions (Done)    Learning Progress Summary     Learner Status Readiness Method Response Comment Documented by    Patient Done Acceptance E,TB,D VU,NR   05/17/18 1311                      User Key     Initials Effective Dates Name Provider Type Discipline     04/03/18 -  Andressa Musa, PT Physical Therapist PT                    PT Recommendation and Plan  Anticipated Discharge Disposition (PT): skilled nursing facility (SNF)  Planned Therapy Interventions (PT Eval): balance training, bed mobility training, gait training, home exercise program, patient/family education, transfer training  Therapy Frequency (PT Clinical Impression): daily  Outcome Summary/Treatment Plan (PT)  Anticipated Discharge Disposition (PT): skilled nursing facility (SNF)  Plan of Care Reviewed With: patient  Outcome Summary: Pt presents with impaired functional mobility and gait secondary to generalized weakness, impaired balance, and decreased activity tolerance s/p SDh and fall. Pt may benefit from skilled PT to address strength, mobility, and gait.           Outcome Measures     Row Name 05/17/18 1300             How much help from another person do you currently need...    Turning from your back to your side while in flat bed without using bedrails? 2  -CH      Moving from  lying on back to sitting on the side of a flat bed without bedrails? 2  -CH      Moving to and from a bed to a chair (including a wheelchair)? 1  -CH      Standing up from a chair using your arms (e.g., wheelchair, bedside chair)? 1  -CH      Climbing 3-5 steps with a railing? 1  -CH      To walk in hospital room? 1  -CH      AM-PAC 6 Clicks Score 8  -CH         Functional Assessment    Outcome Measure Options AM-PAC 6 Clicks Basic Mobility (PT)  -        User Key  (r) = Recorded By, (t) = Taken By, (c) = Cosigned By    Initials Name Provider Type     Andressa Musa, PT Physical Therapist           Time Calculation:         PT Charges     Row Name 05/17/18 1042             Time Calculation    Start Time 1023  -      Stop Time 1039  -      Time Calculation (min) 16 min  -      PT Received On 05/17/18  -      PT - Next Appointment 05/18/18  -      PT Goal Re-Cert Due Date 05/24/18  -        User Key  (r) = Recorded By, (t) = Taken By, (c) = Cosigned By    Initials Name Provider Type     Andressa Musa, PT Physical Therapist          Therapy Charges for Today     Code Description Service Date Service Provider Modifiers Qty    99340749137 HC PT EVAL MOD COMPLEXITY 2 5/17/2018 Andressa Musa, PT GP 1    93222204494 HC PT THER PROC EA 15 MIN 5/17/2018 Andressa Musa PT GP 1    44823669696 HC PT THER SUPP EA 15 MIN 5/17/2018 Andressa Musa, PT GP 1          PT G-Codes  Outcome Measure Options: AM-PAC 6 Clicks Basic Mobility (PT)    Andressa Musa, PT  5/17/2018

## 2018-05-17 NOTE — SIGNIFICANT NOTE
05/17/18 1356   Rehab Time/Intention   Evaluation Not Performed other (see comments);patient unavailable for evaluation  (pt not appropraite this pm. Will follow)   Rehab Treatment   Discipline occupational therapist   Recommendation   OT - Next Appointment 05/18/18

## 2018-05-18 ENCOUNTER — TELEPHONE (OUTPATIENT)
Dept: NEUROSURGERY | Facility: CLINIC | Age: 78
End: 2018-05-18

## 2018-05-18 DIAGNOSIS — S06.5XAA SDH (SUBDURAL HEMATOMA) (HCC): Primary | ICD-10-CM

## 2018-05-18 LAB
ALBUMIN SERPL-MCNC: 3.2 G/DL (ref 3.5–5.2)
ALP SERPL-CCNC: 83 U/L (ref 39–117)
ALT SERPL W P-5'-P-CCNC: 56 U/L (ref 1–33)
AST SERPL-CCNC: 28 U/L (ref 1–32)
BILIRUB CONJ SERPL-MCNC: 0.2 MG/DL (ref 0–0.3)
BILIRUB INDIRECT SERPL-MCNC: 0.5 MG/DL
BILIRUB SERPL-MCNC: 0.7 MG/DL (ref 0.1–1.2)
GLUCOSE BLDC GLUCOMTR-MCNC: 167 MG/DL (ref 70–130)
GLUCOSE BLDC GLUCOMTR-MCNC: 171 MG/DL (ref 70–130)
GLUCOSE BLDC GLUCOMTR-MCNC: 171 MG/DL (ref 70–130)
GLUCOSE BLDC GLUCOMTR-MCNC: 203 MG/DL (ref 70–130)
GLUCOSE BLDC GLUCOMTR-MCNC: 218 MG/DL (ref 70–130)
PROT SERPL-MCNC: 5.9 G/DL (ref 6–8.5)

## 2018-05-18 PROCEDURE — 25010000002 LEVETIRACETAM IN NACL 0.82% 500 MG/100ML SOLUTION: Performed by: PSYCHIATRY & NEUROLOGY

## 2018-05-18 PROCEDURE — 92526 ORAL FUNCTION THERAPY: CPT | Performed by: SPEECH-LANGUAGE PATHOLOGIST

## 2018-05-18 PROCEDURE — 97110 THERAPEUTIC EXERCISES: CPT

## 2018-05-18 PROCEDURE — 99232 SBSQ HOSP IP/OBS MODERATE 35: CPT | Performed by: NURSE PRACTITIONER

## 2018-05-18 PROCEDURE — 80076 HEPATIC FUNCTION PANEL: CPT | Performed by: INTERNAL MEDICINE

## 2018-05-18 PROCEDURE — 82962 GLUCOSE BLOOD TEST: CPT

## 2018-05-18 PROCEDURE — 25010000002 LORAZEPAM PER 2 MG: Performed by: INTERNAL MEDICINE

## 2018-05-18 PROCEDURE — 63710000001 INSULIN ASPART PER 5 UNITS: Performed by: INTERNAL MEDICINE

## 2018-05-18 RX ORDER — RISPERIDONE 1 MG/1
1 TABLET ORAL 3 TIMES DAILY
Status: DISCONTINUED | OUTPATIENT
Start: 2018-05-18 | End: 2018-05-18

## 2018-05-18 RX ORDER — LEVETIRACETAM 500 MG/1
500 TABLET ORAL 2 TIMES DAILY
Status: DISCONTINUED | OUTPATIENT
Start: 2018-05-18 | End: 2018-05-19

## 2018-05-18 RX ORDER — LOSARTAN POTASSIUM 50 MG/1
50 TABLET ORAL DAILY
Status: DISCONTINUED | OUTPATIENT
Start: 2018-05-18 | End: 2018-05-23 | Stop reason: HOSPADM

## 2018-05-18 RX ORDER — THIAMINE MONONITRATE (VIT B1) 100 MG
100 TABLET ORAL DAILY
Status: DISCONTINUED | OUTPATIENT
Start: 2018-05-18 | End: 2018-05-23 | Stop reason: HOSPADM

## 2018-05-18 RX ORDER — DOCUSATE SODIUM 100 MG/1
100 CAPSULE, LIQUID FILLED ORAL DAILY
Status: DISCONTINUED | OUTPATIENT
Start: 2018-05-18 | End: 2018-05-23 | Stop reason: HOSPADM

## 2018-05-18 RX ADMIN — SODIUM CHLORIDE 75 ML/HR: 9 INJECTION, SOLUTION INTRAVENOUS at 09:17

## 2018-05-18 RX ADMIN — INSULIN ASPART 5 UNITS: 100 INJECTION, SOLUTION INTRAVENOUS; SUBCUTANEOUS at 23:22

## 2018-05-18 RX ADMIN — Medication 100 MG: at 15:07

## 2018-05-18 RX ADMIN — LOSARTAN POTASSIUM 50 MG: 50 TABLET, FILM COATED ORAL at 15:07

## 2018-05-18 RX ADMIN — INSULIN ASPART 5 UNITS: 100 INJECTION, SOLUTION INTRAVENOUS; SUBCUTANEOUS at 18:01

## 2018-05-18 RX ADMIN — LEVETIRACETAM 500 MG: 5 INJECTION INTRAVENOUS at 10:05

## 2018-05-18 RX ADMIN — QUETIAPINE FUMARATE 25 MG: 25 TABLET, FILM COATED ORAL at 12:10

## 2018-05-18 RX ADMIN — FOLIC ACID 1 MG: 1 TABLET ORAL at 12:10

## 2018-05-18 RX ADMIN — DOCUSATE SODIUM 100 MG: 100 CAPSULE, LIQUID FILLED ORAL at 14:27

## 2018-05-18 RX ADMIN — QUETIAPINE FUMARATE 25 MG: 25 TABLET, FILM COATED ORAL at 22:29

## 2018-05-18 RX ADMIN — INSULIN ASPART 3 UNITS: 100 INJECTION, SOLUTION INTRAVENOUS; SUBCUTANEOUS at 12:09

## 2018-05-18 RX ADMIN — LORAZEPAM 1 MG: 2 INJECTION INTRAMUSCULAR; INTRAVENOUS at 13:47

## 2018-05-18 RX ADMIN — LEVETIRACETAM 500 MG: 500 TABLET, FILM COATED ORAL at 22:29

## 2018-05-18 NOTE — PLAN OF CARE
Problem: Fall Risk (Adult)  Goal: Identify Related Risk Factors and Signs and Symptoms  Outcome: Ongoing (interventions implemented as appropriate)    Goal: Absence of Fall  Outcome: Ongoing (interventions implemented as appropriate)      Problem: Skin Injury Risk (Adult)  Goal: Identify Related Risk Factors and Signs and Symptoms  Outcome: Ongoing (interventions implemented as appropriate)    Goal: Skin Health and Integrity  Outcome: Ongoing (interventions implemented as appropriate)      Problem: Patient Care Overview  Goal: Plan of Care Review  Outcome: Ongoing (interventions implemented as appropriate)   05/18/18 1975   Coping/Psychosocial   Plan of Care Reviewed With patient;family   Plan of Care Review   Progress improving   OTHER   Outcome Summary Will go to rehab at d/c. Restraint free, since 1200 and doing well. Eating with assistance of one to feed. Sat up in chair this morning with family present. Will continue to monitor.

## 2018-05-18 NOTE — PLAN OF CARE
Problem: Patient Care Overview  Goal: Plan of Care Review  Outcome: Ongoing (interventions implemented as appropriate)   05/18/18 1313   Coping/Psychosocial   Plan of Care Reviewed With patient   OTHER   Outcome Summary Pt much more cooperative today. Pt took trials of thin, nectar, puree, and soft solids. Inconsistent throat clear with thin and with nectar by straw - noted with fatigue. No overt s/s aspiration with nectar by cup, puree, or soft solids. Adequate mastication and oral clearance. Timing fair with adequate laryngeal elevation. REC mechanical soft, no mixed consistencies with nectar thick liquids. No straws and upright positioning. Meds to be given in puree.

## 2018-05-18 NOTE — PLAN OF CARE
Problem: Patient Care Overview  Goal: Plan of Care Review  Outcome: Ongoing (interventions implemented as appropriate)   05/18/18 8077   Coping/Psychosocial   Plan of Care Reviewed With patient   OTHER   Outcome Summary Pt with posterior leaning in sittin initially and then in standing. She bears weight well BLE but unable to amb due to post lean today. bed to chair only with moda asst of 1.

## 2018-05-18 NOTE — SIGNIFICANT NOTE
05/18/18 1533   Rehab Time/Intention   Evaluation Not Performed patient unavailable for evaluation  (nsg procedure 1400)   Rehab Treatment   Discipline occupational therapist

## 2018-05-18 NOTE — NURSING NOTE
Called and notified Dr. Sagastume of Calf vein thrombosis bilaterally.  Ok to ambulate with PT.  Radha Caruso RN

## 2018-05-18 NOTE — THERAPY RE-EVALUATION
Acute Care - Speech Language Pathology   Swallow Re-Evaluation  [facility]     Patient Name: Angela Carney  : 1940  MRN: [id number]  Today's Date: 2018  Onset of Illness/Injury or Date of Surgery: 18            Admit Date: 2018    Visit Dx:     ICD-10-CM ICD-9-CM   1. Subdural bleeding I62.00 432.1   2. Generalized weakness R53.1 780.79     Patient Active Problem List   Diagnosis   • Subdural bleeding   • Intracranial hematoma   • Frequent falls   • Dementia     Past Medical History:   Diagnosis Date   • Dementia    • Diabetes mellitus    • Frequent UTI    • Stroke      History reviewed. No pertinent surgical history.       SWALLOW EVALUATION (last 72 hours)      SLP Adult Swallow Evaluation     Row Name 18 1130 18 1140                Rehab Evaluation    Document Type re-evaluation  -SA evaluation  -SL       Subjective Information no complaints  -SA no complaints  -SL       Patient Observations alert;cooperative  -SA alert   confused  -SL       Patient/Family Observations  -- pt talking incessantly- incoherent; very confused; could not follow any commands  -SL       Patient Effort good  -SA poor  -SL       Comment  -- confusion limits ability to participate  -SL       Symptoms Noted During/After Treatment none  -SA none  -SL          General Information    Patient Profile Reviewed yes  -SA yes  -SL       Pertinent History Of Current Problem SDH, L occipital hem storke, h/o dementia, NH resident  -SA Admitted with falls, large SDH - L hemisphere, with midline shift, and L occipital hemorrhage stroke; Hx- dementia- Nursing home resident  -SL       Current Method of Nutrition NPO  -SA NPO  -SL       Precautions/Limitations, Hearing  -- WFL  -SL       Prior Level of Function-Communication  -- cognitive-linguistic impairment   memory impairment- old CVA  -SL       Prior Level of Function-Swallowing no diet consistency restrictions  -SA no diet consistency restrictions  -SL        Plans/Goals Discussed with patient;family  - patient;family  -       Barriers to Rehab medically complex;previous functional deficit  - medically complex;previous functional deficit;cognitive status;uncooperative  -       Patient's Goals for Discharge patient did not state  - patient did not state;patient could not state  -       Family Goals for Discharge family did not state  - patient able to return to PO diet  -          Pain Assessment    Additional Documentation Pain Scale: Numbers Pre/Post-Treatment (Group)  -SA  --          Pain Scale: Numbers Pre/Post-Treatment    Pain Scale: Numbers, Pretreatment 0/10 - no pain  -SA  --          Oral Motor and Function    Dentition Assessment  -- natural, present and adequate  -       Secretion Management  -- WNL/WFL  -SL       Mucosal Quality  -- moist, healthy  -SL       Volitional Swallow  -- delayed;weak  -SL       Volitional Cough  -- unable to elicit  -          Oral Musculature and Cranial Nerve Assessment    Oral Motor General Assessment  -- unable to assess  -SL       Oral Motor, Comment  -- pt could not follow any verbal commands to compelte oral motor mvmts; voice was weak- pt could not throat clear/cough on command  -          Clinical Swallow Eval    Oral Prep Phase  -- impaired  -SL       Oral Transit  -- impaired  -SL       Oral Residue  -- impaired  -SL       Pharyngeal Phase  -- suspected pharyngeal impairment  -       Clinical Swallow Evaluation Summary Pt much more cooperative today.  Pt took trials of thin, nectar, puree, and soft solids.  Inconsistent throat clear with thin and with nectar by straw - noted with fatigue.  No overt s/s aspiration with nectar by cup, puree, or soft solids.  Adequate mastication and oral clearance.  Timing fair with adequate laryngeal elevation.  REC mechanical soft, no mixed consistencies with nectar thick liquids.  No straws and upright positioning.  Meds to be given in puree.    - Pt was  resistant to placement of spon/cup- would not close lips on spoon or cup; wet voice after ice ship trials- never cleared; anterior spillage and wet voice noted after thin liquid on spoon or from cup, nectar thick liquids; poor bolus control and a-p mvmt with honey thick liq, and puree- pt resistant to taking , holdingteeth tight together- swallow triggered after delay, however laryngeal elevation appeared weak/reduced; wet voice noted with all  consistencies presented; pt talking throughout eval- placing her more at risk for aspiration as well; pt is not appropriate for po at this time due to overall weakness, swallow delay, and cognitive impairment  -SL          Clinical Impression    SLP Swallowing Diagnosis suspected pharyngeal dysfunction  -SA mod-severe;oral dysfunction;suspected pharyngeal dysfunction  -SL       Functional Impact risk of aspiration/pneumonia  -SA risk of aspiration/pneumonia  -SL       Rehab Potential/Prognosis, Swallowing good, to achieve stated therapy goals  -SA adequate, monitor progress closely  -SL       Criteria for Skilled Therapeutic Interventions Met demonstrates skilled criteria  -SA demonstrates skilled criteria  -SL          Recommendations    Therapy Frequency (Swallow) PRN  -SA PRN  -SL       Predicted Duration Therapy Intervention (Days) until discharge  -SA until discharge  -SL       SLP Diet Recommendation mechanical soft with no mixed consistencies;nectar thick liquids  -SA NPO  -SL       Recommended Diagnostics other (see comments)   re-eval for upgrade vs need for instrumental  -SA reassess via clinical swallow evaluation  -SL       Recommended Precautions and Strategies upright posture during/after eating;small bites of food and sips of liquid;no straw  -SA  --       SLP Rec. for Method of Medication Administration with pudding or applesauce;meds crushed  -SA meds via alternate route  -SL       Monitor for Signs of Aspiration cough;gurgly voice;right lower lobe  infiltrates;pneumonia  - yes;notify SLP if any concerns  -       Anticipated Dischage Disposition skilled nursing facility  - unknown  -          Swallow Goals (SLP)    Oral Nutrition/Hydration Goal Selection (SLP) oral nutrition/hydration, SLP goal 1  -  --          Oral Nutrition/Hydration Goal 1 (SLP)    Oral Nutrition/Hydration Goal 1, SLP Pt will tolerate recommended diet  -  --         User Key  (r) = Recorded By, (t) = Taken By, (c) = Cosigned By    Initials Name Effective Dates     Krystal Serena, MS CCC-SLP 03/07/18 -      Krystal Melly, MS CCC-SLP 04/13/15 -         EDUCATION  The patient has been educated in the following areas:   Dysphagia (Swallowing Impairment) Modified Diet Instruction.    SLP Recommendation and Plan  SLP Swallowing Diagnosis: suspected pharyngeal dysfunction  SLP Diet Recommendation: mechanical soft with no mixed consistencies, nectar thick liquids  Recommended Precautions and Strategies: upright posture during/after eating, small bites of food and sips of liquid, no straw     Monitor for Signs of Aspiration: cough, gurgly voice, right lower lobe infiltrates, pneumonia  Recommended Diagnostics: other (see comments) (re-eval for upgrade vs need for instrumental)  Criteria for Skilled Therapeutic Interventions Met: demonstrates skilled criteria  Anticipated Dischage Disposition: skilled nursing facility  Rehab Potential/Prognosis, Swallowing: good, to achieve stated therapy goals  Therapy Frequency (Swallow): PRN  Predicted Duration Therapy Intervention (Days): until discharge       Plan of Care Reviewed With: patient  Plan of Care Review  Plan of Care Reviewed With: patient  Outcome Summary: Pt much more cooperative today.  Pt took trials of thin, nectar, puree, and soft solids.  Inconsistent throat clear with thin and with nectar by straw - noted with fatigue.  No overt s/s aspiration with nectar by cup, puree, or soft solids.  Adequate mastication and oral clearance.   Timing fair with adequate laryngeal elevation.  REC mechanical soft, no mixed consistencies with nectar thick liquids.  No straws and upright positioning.  Meds to be given in puree.          SLP GOALS     Row Name 05/18/18 1130             Oral Nutrition/Hydration Goal 1 (SLP)    Oral Nutrition/Hydration Goal 1, SLP Pt will tolerate recommended diet  -        User Key  (r) = Recorded By, (t) = Taken By, (c) = Cosigned By    Initials Name Provider Type    SA Krystal Lyons MS CCC-SLP Speech and Language Pathologist             SLP Outcome Measures (last 72 hours)      SLP Outcome Measures     Row Name 05/18/18 1130 05/17/18 1300          SLP Outcome Measures    Outcome Measure Used? Adult NOMS  -SA Adult NOMS  -SL        FCM Scores    Swallowing FCM Score 4  - 1  -       User Key  (r) = Recorded By, (t) = Taken By, (c) = Cosigned By    Initials Name Effective Dates    SA Krystal Lyons MS CCC-SLP 03/07/18 -      Krystal Pickard MS CCC-SONALI 04/13/15 -            Time Calculation:         Time Calculation- SLP     Row Name 05/18/18 1313             Time Calculation- [facility]    SLP Start Time 1130  -      SLP Stop Time 1230  -      SLP Time Calculation (min) 60 min  -      SLP Received On 05/18/18  -        User Key  (r) = Recorded By, (t) = Taken By, (c) = Cosigned By    Initials Name Provider Type    SA Krystal Lyons MS CCC-SLP Speech and Language Pathologist          Therapy Charges for Today     Code Description Service Date Service Provider Modifiers Qty    86093343202 HC ST TREATMENT SWALLOW 4 5/18/2018 Krystal Lyons MS CCC-SLP GN 1               Krystal Lyons MS CCC-SONALI  5/18/2018

## 2018-05-18 NOTE — PROGRESS NOTES
"DOS: 2018  NAME: Angela Carney   : 1940  PCP: Ezra Krishnamurthy MD  No chief complaint on file.    Stroke    Historian: Chart and nursing     Subjective: No events overnight. Moved out of ICU to Neuromonitor floor yesterday. She remains neurologically stable. Still very restless; restraints d/c'd earlier today. She has required ativan since restraints d/c'd.   Objective:  Vital signs: /71 (BP Location: Left arm, Patient Position: Sitting)   Pulse 92   Temp 98.2 °F (36.8 °C) (Oral)   Resp 18   Ht 162.6 cm (64\")   Wt 72.9 kg (160 lb 11.5 oz)   SpO2 99%   BMI 27.59 kg/m²       HEENT: Normocephalic (ecchymosis to Left lower eye (noted yesterday as well)   COR:   Resp: Even and unlabored  Extremities: Equal pulses, non distal embolization    Neurological: exam limited by patients willingess to participate. Will not follow any commands on my exam.   MS: Arousable Language appear normal. No obv. neglect.   CN: II-XII normal most of the cranial could not be examined as patient does not; no obv. Facial asymmetry. participate    Motor: Moves all 4 ext. Equally   Sensory: Unable   Coordination: Unable   Drips: Precedex off    Laboratory results:  Lab Results   Component Value Date    GLUCOSE 178 (H) 2018    CALCIUM 9.1 2018     2018    K 5.3 (H) 2018    CO2 23.3 2018     2018    BUN 25 (H) 2018    CREATININE 1.07 (H) 2018    EGFRIFNONA 50 (L) 2018    BCR 23.4 2018    ANIONGAP 13.7 2018     Lab Results   Component Value Date    WBC 6.93 2018    HGB 9.2 (L) 2018    HCT 28.8 (L) 2018    .3 (H) 2018     2018         Review and interpretation of imagin2018 CT Head   IMPRESSION:  Stable appearance of multiple areas of hemorrhage including  a broad heterogeneous septated or multicompartmental subdural overlying  the left cerebral hemisphere, a smaller subdural extending " inferior to  the left occipital lobe along the tentorium cerebelli, and  intraparenchymal hemorrhage involving the left occipital lobe medially  as well as areas of subarachnoid hemorrhage involving the sylvian  fissure on the left involving the central sulcus superiorly.    05/17/2018  CT Head   IMPRESSION:  1. The very large acute left subdural has diminished fairly  significantly in size with an associated significant improvement in mass  effect including reduction of midline shift now 3 mm.  2. Other findings are stable.        05/17/2018  Bilateral Lower Ext. Dopplar   Interpretation Summary     · Acute right lower extremity deep vein thrombosis noted in the peroneal.  · Acute left lower extremity deep vein thrombosis noted in the gastrocnemius/soleal.  · All other veins appeared normal bilaterally.            Impression: This is a 77 yo female w/ history of dementia living in a nursing home w/ report of multiple falls over he past few weeks who presented to [facility] after an unwitnessed fall. Imaging at [place] revealed left subdural hematoma with the moderate midline shift.  as well as a new left occipital hemorrhage concerning for subacute bleed and patient was subsequently transferred to [facility] for neurosurgical support.  Repeat Imaging today revealed very large acute left subdural which has improved mass effect and reduction in midline shift when compared to 5/16 imaging.     Patient stable; transferred out of ICU yesterday.Duplex scan of bilateral lower ext. Completed yesterday revealed bilateral LE DVTs; anticoagulation contraindicated per neurosurgery. Neurologically unchanged. She is demented at baseline and not willing to participate in my exam again today . No change in current plan will will continue to monitor progress. PT/OT/ST. CCP to assit with discharge planning. No further neurology workup indicated. We will sign off and see again upon request.    Plan:  · Discontinue Risperdal   · Platelet  transfusion (complete)   · Keppra 500mg q 12hrs   · BP parameters per Neurosurgery recommendations   · Q1 hour Neurochecks   · Follow-Up in stroke clinic in 3 months    Case reviewed w/ Dr. Damon and he agrees with plan above.

## 2018-05-18 NOTE — PROGRESS NOTES
"[facility] PULMONARY CARE         Dr Soto Sayied   LOS: 2 days   Patient Care Team:  Ezra Krishnamurthy MD as PCP - General (Internal Medicine)    Chief Complaint: Traumatic subdural hematoma with history of CVA on Plavix and aspirin    Interval History more awake today and sitting up in a chair.  Family feels mental status much improved.    REVIEW OF SYSTEMS:   Awake and denies chest pain or shortness of breath.    Ventilator/Non-Invasive Ventilation Settings     None            Vital Signs  Temp:  [97.6 °F (36.4 °C)-98.7 °F (37.1 °C)] 98.2 °F (36.8 °C)  Heart Rate:  [] 92  Resp:  [18-24] 18  BP: (133-183)/(70-98) 134/71  No intake or output data in the 24 hours ending 05/18/18 1319  Flowsheet Rows      First Filed Value   Admission Height  162.6 cm (64\") Documented at 05/16/2018 1535   Admission Weight  72.9 kg (160 lb 11.5 oz) Documented at 05/16/2018 1535          Physical Exam:   General Appearance:   Awake and following commands.  HEENT bruising periorbital with bruising more prominent on the left side of the face.     Lungs:     Clear to auscultation,respirations regular, even and                  unlabored    Heart:    Regular rhythm and normal rate, normal S1 and S2, no            murmur, no gallop, no rub, no click   Chest Wall:    No abnormalities observed   Abdomen:     Normal bowel sounds, no masses, no organomegaly, soft        non-tender, non-distended, no guarding, no rebound                tenderness   Extremities:   Moves all extremities well, no edema, no cyanosis, no             redness     Results Review:          Results from last 7 days  Lab Units 05/17/18  0550   SODIUM mmol/L 139   POTASSIUM mmol/L 5.3*   CHLORIDE mmol/L 102   CO2 mmol/L 23.3   BUN mg/dL 25*   CREATININE mg/dL 1.07*   GLUCOSE mg/dL 178*   CALCIUM mg/dL 9.1           Results from last 7 days  Lab Units 05/17/18  0550   WBC 10*3/mm3 6.93   HEMOGLOBIN g/dL 9.2*   HEMATOCRIT % 28.8*   PLATELETS 10*3/mm3 264 "       Results from last 7 days  Lab Units 05/17/18  0832   INR  1.02   APTT seconds 25.7                       I reviewed the patient's new clinical results.  I personally viewed and interpreted the patient's CXR        Medication Review:     docusate sodium 100 mg Oral Daily   folic acid 1 mg Oral Daily   insulin aspart 0-14 Units Subcutaneous 4x Daily With Meals & Nightly   levETIRAcetam 500 mg Oral BID   losartan 50 mg Oral Daily   QUEtiapine 25 mg Oral Q12H   risperiDONE 1 mg Oral TID   thiamine 100 mg Oral Daily            ASSESSMENT:   Traumatic subdural hematoma  Status post fall  Altered mental status  History of CVA on Plavix and aspirin  Calf vein thrombosis  Diabetes mellitus  Elevated LFTs  Hypertension  Dementia    PLAN:  Mental status definitely improved.  Continue Seroquel  Patient did receive platelets yesterday  We will trend LFTs  Switch to oral medications  Calf vein thrombosis with contraindication to anticoagulation or antiplatelets per neurosurgery.  Possibly need to repeat Doppler in 24-48 hours  Diet per speech  Ambulate with physical therapy    Charles Sagastume MD  05/18/18  1:19 PM

## 2018-05-18 NOTE — PROGRESS NOTES
Continued Stay Note   [facility]     Patient Name: Angela Carney  MRN: [id number]  Today's Date: 5/18/2018    Admit Date: 5/16/2018          Discharge Plan     Row Name 05/18/18 1259       Plan    Plan [facility] care facility    Patient/Family in Agreement with Plan yes    Plan Comments Met with dgnikki Blevins and patient's spouse in room.  [facility] is their first choice.  Left message for Ismael Cornejo with [facility] to confirm bed availability.  Patient had sitter discontinued today at noon.                Discharge Codes    No documentation.           Chelsi Alcala RN

## 2018-05-18 NOTE — PLAN OF CARE
Problem: Fall Risk (Adult)  Goal: Absence of Fall  Outcome: Ongoing (interventions implemented as appropriate)      Problem: Skin Injury Risk (Adult)  Goal: Skin Health and Integrity  Outcome: Ongoing (interventions implemented as appropriate)      Problem: Patient Care Overview  Goal: Plan of Care Review  Outcome: Ongoing (interventions implemented as appropriate)   05/18/18 0515   Plan of Care Review   Progress no change   OTHER   Outcome Summary Pt admitted from ICU, confused, unable to follow directions, restless, in restraints due to pulling lines, vss, positive for inessa calf vein thrombosis, dr rockwell aware, mepilex to l shoulder, l elbow, and sacrum, yang area red, cream applied, turned q 2 hours, incontinent of bladder, will cont to monitor        Problem: Restraint, Nonbehavioral (Nonviolent)  Goal: Rationale and Justification  Outcome: Ongoing (interventions implemented as appropriate)    Goal: Nonbehavioral (Nonviolent) Restraint: Absence of Injury/Harm  Outcome: Ongoing (interventions implemented as appropriate)    Goal: Nonbehavioral (Nonviolent) Restraint: Achievement of Discontinuation Criteria  Outcome: Ongoing (interventions implemented as appropriate)    Goal: Nonbehavioral (Nonviolent) Restraint: Preservation of Dignity and Wellbeing  Outcome: Ongoing (interventions implemented as appropriate)

## 2018-05-18 NOTE — TELEPHONE ENCOUNTER
----- Message from DANN Sandoval sent at 5/18/2018  3:51 PM EDT -----  Regarding: f/u  Pt seen for SDH/IPH  by Dr. Alfonso Huber on May 16, 2018. Need follow up in 2 week(s)  with imaging cTH. Ok to see NP or MD- available. Likely going to long term care and maybe in [place]

## 2018-05-18 NOTE — THERAPY TREATMENT NOTE
Acute Care - Physical Therapy Treatment Note   [facility]     Patient Name: Angela Carney  : 1940  MRN: [id number]  Today's Date: 2018  Onset of Illness/Injury or Date of Surgery: 18          Admit Date: 2018    Visit Dx:    ICD-10-CM ICD-9-CM   1. Subdural bleeding I62.00 432.1   2. Generalized weakness R53.1 780.79     Patient Active Problem List   Diagnosis   • Subdural bleeding   • Intracranial hematoma   • Frequent falls   • Dementia       Therapy Treatment          Rehabilitation Treatment Summary     Row Name 18 1125             Treatment Time/Intention    Discipline physical therapist  -SV      Document Type therapy note (daily note)  -SV      Subjective Information no complaints  -SV      Patient/Family Observations none present  -SV      Total Minutes, Physical Therapy Treatment 15  -SV      Therapy Frequency (PT Clinical Impression) daily  -SV      Patient Effort adequate  -SV      Comment RN  verbal order to cont PT/mobilization with new DVT noted on chart  -SV      Existing Precautions/Restrictions --   fall risk, cognitive  -SV      Recorded by [SV] Yola Buckner, PT 18 1139      Row Name 18 1125             Vital Signs    Pre Systolic BP Rehab 155  -SV      Pre Treatment Diastolic BP 75  -SV      Posttreatment Heart Rate (beats/min) 91  -SV      Intra SpO2 (%) 90  -SV      O2 Delivery Intra Treatment room air  -SV      Post SpO2 (%) 93  -SV      O2 Delivery Post Treatment room air  -SV      Recorded by [SV] Yola Buckner, PT 18 1139      Row Name 18 1125             Bed Mobility Assessment/Treatment    Supine-Sit [place] (Bed Mobility) moderate assist (50% patient effort);maximum assist (25% patient effort);2 person assist  -SV      Sit-Supine [place] (Bed Mobility) not tested  -SV      Recorded by [SV] Yola Buckner, PT 18 1139      Row Name 18 1125             Transfer Assessment/Treatment    Transfer  Assessment/Treatment bed-chair transfer  -SV      Comment (Transfers) stood with posterior lean  mod asst of 1 four minutes HHA  -SV      Bed-Chair Pitt (Transfers) moderate assist (50% patient effort);minimum assist (75% patient effort);2 person assist;other (see comments)   posterior lean  -SV      Sit-Stand [place] (Transfers) moderate assist (50% patient effort);maximum assist (25% patient effort);1 person assist  -SV      Recorded by [SV] Yola Buckner, PT 05/18/18 1139      Row Name 05/18/18 1125             Positioning and Restraints    Pre-Treatment Position in bed  -SV      Post Treatment Position chair  -SV      In Bed call light within reach;encouraged to call for assist;exit alarm on;notified nsg;with SLP  -SV      Recorded by [SV] Yola Buckner, PT 05/18/18 1139      Row Name 05/18/18 1125             Pain Assessment    Additional Documentation --   denies, no signs  -SV      Recorded by [SV] Yola Buckner, PT 05/18/18 1139      Row Name                Wound 05/16/18 1230 sacral spine pressure injury    Wound - Properties Group Date first assessed: 05/16/18 [MC] Time first assessed: 1230 [MC] Present On Admission : yes [MC] Location: sacral spine [MC] Type: pressure injury [MC] Stage, Pressure Injury: deep tissue injury [MC] Recorded by:  [MC] Douglas Fernández RN 05/16/18 1847    Row Name                Wound 05/17/18 0745 Left lateral other (see notes) abrasion    Wound - Properties Group Date first assessed: 05/17/18 [BS] Time first assessed: 0745 [BS] Present On Admission : yes [BS] Side: Left [BS] Orientation: lateral [BS] Location: other (see notes) [BS], shoulder  Type: abrasion [BS] Recorded by:  [BS] Fatimah Storm RN 05/17/18 3794      User Key  (r) = Recorded By, (t) = Taken By, (c) = Cosigned By    Initials Name Effective Dates Discipline     Douglas Fernández RN 06/16/16 -  Nurse    DYAN Storm RN 06/16/16 -  Nurse    SV Yola Buckner, PT 04/03/18 -   PT          Wound 05/16/18 1230 sacral spine pressure injury (Active)   Dressing Appearance open to air 5/18/2018  7:58 AM   Base pink 5/18/2018  7:58 AM   Periwound excoriated 5/17/2018  3:50 PM   Drainage Amount none 5/18/2018  7:58 AM   Care, Wound cleansed with;sterile normal saline 5/17/2018  8:00 PM   Dressing Care, Wound low-adherent 5/18/2018  7:58 AM   Periwound Care, Wound barrier ointment applied 5/17/2018  8:00 PM       Wound 05/17/18 0745 Left lateral other (see notes) abrasion (Active)   Dressing Appearance dried drainage;intact 5/17/2018  8:00 PM   Base pink 5/17/2018  8:00 PM   Periwound pink;edematous;ecchymotic 5/18/2018  7:58 AM   Drainage Characteristics/Odor sanguineous 5/17/2018  8:00 PM   Drainage Amount small 5/17/2018  8:00 PM   Care, Wound cleansed with;sterile normal saline 5/17/2018  8:00 PM   Dressing Care, Wound low-adherent 5/18/2018  7:58 AM       Wound Left lateral elbow skin tear (Active)   Dressing Appearance dried drainage 5/17/2018  8:00 PM   Base pink 5/17/2018  8:00 PM   Periwound ecchymotic 5/18/2018  7:58 AM   Drainage Amount scant 5/17/2018  8:00 PM   Care, Wound cleansed with;sterile normal saline 5/17/2018  8:00 PM   Dressing Care, Wound low-adherent 5/18/2018  7:58 AM             Physical Therapy Education     Title: PT OT SLP Therapies (Active)     Topic: Physical Therapy (Active)     Point: Mobility training (Active)    Learning Progress Summary     Learner Status Readiness Method Response Comment Documented by    Patient Active Acceptance E,D NR,NL   05/18/18 1139     Done Acceptance E VU,NR,NL   05/17/18 1335     Done Acceptance E,TB,D MARIO,NR   05/17/18 1311    Family Done Acceptance E VU,NR,NL   05/17/18 1335          Point: Home exercise program (Active)    Learning Progress Summary     Learner Status Readiness Method Response Comment Documented by    Patient Active Acceptance E,D NR,NL   05/18/18 1139     Done Acceptance E VU,NR,NL   05/17/18 6653      Done Acceptance E,TB,D VU,NR   05/17/18 1311    Family Done Acceptance E VU,NR,NL   05/17/18 1335          Point: Body mechanics (Active)    Learning Progress Summary     Learner Status Readiness Method Response Comment Documented by    Patient Active Acceptance E,D NR,NL   05/18/18 1139     Done Acceptance E VU,NR,NL   05/17/18 1335     Done Acceptance E,TB,D VU,NR   05/17/18 1311    Family Done Acceptance E VU,NR,NL   05/17/18 1335          Point: Precautions (Active)    Learning Progress Summary     Learner Status Readiness Method Response Comment Documented by    Patient Active Acceptance E,D NR,NL   05/18/18 1139     Done Acceptance E VU,NR,NL   05/17/18 1335     Done Acceptance E,TB,D VU,NR   05/17/18 1311    Family Done Acceptance E VU,NR,NL   05/17/18 1335                      User Key     Initials Effective Dates Name Provider Type Discipline     04/13/15 -  Krystal Pickard MS CCC-SLP Speech and Language Pathologist SLP     04/03/18 -  Andressa Musa, PT Physical Therapist PT     04/03/18 -  Yola Buckner, PT Physical Therapist PT                    PT Recommendation and Plan  Therapy Frequency (PT Clinical Impression): daily  Plan of Care Reviewed With: patient  Outcome Summary: Pt with posterior leaning in sittin initially and then in standing. She bears weight well BLE but unable to amb due to post lean today. bed to chair only with moda asst of 1.          Outcome Measures     Row Name 05/18/18 1100 05/17/18 1300          How much help from another person do you currently need...    Turning from your back to your side while in flat bed without using bedrails? 2  -SV 2  -CH     Moving from lying on back to sitting on the side of a flat bed without bedrails? 2  -SV 2  -CH     Moving to and from a bed to a chair (including a wheelchair)? 2  -SV 1  -CH     Standing up from a chair using your arms (e.g., wheelchair, bedside chair)? 2  -SV 1  -CH     Climbing 3-5 steps with a  railing? 1  -SV 1  -CH     To walk in hospital room? 1  -SV 1  -CH     AM-PAC 6 Clicks Score 10  -SV 8  -CH        Functional Assessment    Outcome Measure Options AM-PAC 6 Clicks Basic Mobility (PT)  -SV AM-PAC 6 Clicks Basic Mobility (PT)  -CH       User Key  (r) = Recorded By, (t) = Taken By, (c) = Cosigned By    Initials Name Provider Type     Andressa Musa, PT Physical Therapist     Yola Buckner, PT Physical Therapist           Time Calculation:         PT Charges     Row Name 05/18/18 1141             Time Calculation    Start Time 1125  -SV      Stop Time 1139  -SV      Time Calculation (min) 14 min  -SV      PT Received On 05/18/18  -SV      PT - Next Appointment 05/19/18  -SV        User Key  (r) = Recorded By, (t) = Taken By, (c) = Cosigned By    Initials Name Provider Type    SV Yola Buckner, PT Physical Therapist          Therapy Charges for Today     Code Description Service Date Service Provider Modifiers Qty    51162365806 HC PT THER PROC EA 15 MIN 5/18/2018 Yola Buckner, PT GP 1    64245878791 HC PT THER SUPP EA 15 MIN 5/18/2018 Yola Buckner, PT GP 1          PT G-Codes  Outcome Measure Options: AM-PAC 6 Clicks Basic Mobility (PT)    Yola Buckner, PT  5/18/2018

## 2018-05-18 NOTE — PROGRESS NOTES
[place] [facility] PROGRESS NOTE    PATIENT IDENTIFICATION:   Name:  Angela Carney      MRN:  [id number]     78 y.o.  female               CC:SDH      Subjective     Interval History: Since last encounter, patient has not had new complaints. She denies headache. She is talkative. [place] LE DVTs on doppler    ROS:  Neuro; Confused  HEENT: Denies headache    Objective     Vital signs in last 24 hours:  Temp:  [97.6 °F (36.4 °C)-98.7 °F (37.1 °C)] 98.2 °F (36.8 °C)  Heart Rate:  [] 92  Resp:  [18-24] 18  BP: (126-183)/(51-98) 134/71      Intake/Output this shift:  No intake/output data recorded.      Intake/Output last 3 shifts:  I/O last 3 completed shifts:  In: 1335.4 [I.V.:1335.4]  Out: -     LABS:    Results from last 7 days  Lab Units 05/17/18  0550   WBC 10*3/mm3 6.93   HEMOGLOBIN g/dL 9.2*   HEMATOCRIT % 28.8*   PLATELETS 10*3/mm3 264         Results from last 7 days  Lab Units 05/18/18  0415 05/17/18  0550   SODIUM mmol/L  --  139   POTASSIUM mmol/L  --  5.3*   CHLORIDE mmol/L  --  102   CO2 mmol/L  --  23.3   BUN mg/dL  --  25*   CREATININE mg/dL  --  1.07*   CALCIUM mg/dL  --  9.1   BILIRUBIN mg/dL 0.7 0.5   ALK PHOS U/L 83 85   ALT (SGPT) U/L 56* 70*   AST (SGOT) U/L 28 35*   GLUCOSE mg/dL  --  178*        IMAGING STUDIES:  No new imaging    Old records:  [facility] 6/2016 MRI brain- chronic right Parietal and occipital infarcts as well as chronic cerebellar infarcts bilaterally.  She has moderate degree of chronic small vessel disease.  [facility] CT scan ahead dated May 7, 2018 states no intracranial hemorrhage.  Scalp hematoma on the left.  Generalized cerebral atrophy with small vessel disease and prior stroke evidence    Meds reviewed/changed: Yes    Current Facility-Administered Medications:   •  dextrose (D50W) solution 25 g, 25 g, Intravenous, Q15 Min PRN, Charles Sagastume MD  •  dextrose (GLUTOSE) oral gel 15 g, 15 g, Oral, Q15 Min PRN, Charles Sagastume MD  •  folic acid  (FOLVITE) tablet 1 mg, 1 mg, Oral, Daily, Charles Sagastume MD  •  glucagon (human recombinant) (GLUCAGEN DIAGNOSTIC) injection 1 mg, 1 mg, Subcutaneous, PRN, Charles Sagastume MD  •  insulin aspart (novoLOG) injection 0-14 Units, 0-14 Units, Subcutaneous, 4x Daily With Meals & Nightly, Charles Sagastume MD, 3 Units at 05/17/18 0836  •  labetalol (NORMODYNE,TRANDATE) injection 20 mg, 20 mg, Intravenous, Q8H PRN, Timothy Evans MD  •  levETIRAcetam in NaCl 0.82% (KEPPRA) IVPB 500 mg, 500 mg, Intravenous, Q12H, Nicholas Damon MD, 500 mg at 05/18/18 1005  •  LORazepam (ATIVAN) injection 1 mg, 1 mg, Intravenous, Q4H PRN, Charles Sagastume MD, 1 mg at 05/17/18 1446  •  QUEtiapine (SEROquel) tablet 25 mg, 25 mg, Oral, Q12H, Charles Sagastume MD  •  sodium chloride 0.9 % flush 1-10 mL, 1-10 mL, Intravenous, PRN, Charles Sagastume MD  •  sodium chloride 0.9 % infusion, 75 mL/hr, Intravenous, Continuous, Charles Sagastume MD, Last Rate: 75 mL/hr at 05/18/18 0917, 75 mL/hr at 05/18/18 0917      Physical Exam:    General:   Eyes open spontaneously. Smiles and makes eye     contact today. Her speech is clear but tangential. She     does follow commands  HEENT:    Facial bruising- improved  Neck:    supple    CN III IV VI: EOMI  CN V: Normal facial sensation   CN VII: No obvious weakness      Motor: DUMONT spontaneously and to pain; no UE drift  Extremities: inessa LE edema    Assessment/Plan     ASSESSMENT:    Active Problems:    Subdural bleeding    Intracranial hematoma    Frequent falls    Dementia      PLAN: Patient much more alert and cooperative today. She denies headache. Her conversation is not logical. She has no drift. She may have some visual field deficit but difficult to examine- poorly cooperative for this. She has difficulty finding my hand with hers. She has had 4 other unwitnessed falls on Plavix and ASA with significant SDH. She also has IPH which should have further work up with MRI once blood resolves (6 weeks). She has acute  DVTs but she is not a candidate for AC and resumption of AP should be carefully considered due to her fall risk. We recommend against AP until fall risk can be mitigated. Neurology is following as well. 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 We will sign off and see in follow up in 2-3 weeks with CTH. MRI later.     I discussed the patients findings and my recommendations with family and nursing staff       LOS: 2 days       DANN Xiong  5/18/2018  10:56 AM

## 2018-05-18 NOTE — PROGRESS NOTES
Continued Stay Note   [facility]     Patient Name: Angela Carney  MRN: [id number]  Today's Date: 5/18/2018    Admit Date: 5/16/2018          Discharge Plan     Row Name 05/18/18 1321       Plan    Plan Comments Spoke with Ismael Cornejo with [facility] and patient is approved for [facility] in [place].  Patient does qualify for the floor to SNIF direct admit not requiring precert prior to admission.  Packet in CCP office.    Row Name 05/18/18 8533       Plan    Plan [facility] skilled care facility    Patient/Family in Agreement with Plan yes    Plan Comments Met with magaly Blevins and patient's spouse in room.  [facility] is their first choice.  Left message for Ismael Cornejo with . [facility] to confirm bed availability.  Patient had sitter discontinued today at noon.                Discharge Codes    No documentation.           Chelsi Alcala RN

## 2018-05-18 NOTE — TELEPHONE ENCOUNTER
Patients follow up is with Dr. Huber on 6/4/18 at 3PM with a CT of the head prior at [facility]. I called 5 makenzie and s/w Blanquita and this appointment will be added to the discharge. Letter/Forms sent.

## 2018-05-19 ENCOUNTER — APPOINTMENT (OUTPATIENT)
Dept: CARDIOLOGY | Facility: HOSPITAL | Age: 78
End: 2018-05-19
Attending: INTERNAL MEDICINE

## 2018-05-19 LAB
BH CV LOW VAS LEFT GASTRONEMIUS VESSEL: 1
BH CV LOW VAS LEFT POSTERIOR TIBIAL VESSEL: 1
BH CV LOW VAS RIGHT GASTRONEMIUS VESSEL: 1
BH CV LOW VAS RIGHT LESSER SAPH VESSEL: 1
BH CV LOW VAS RIGHT PERONEAL VESSEL: 1
BH CV LOW VAS RIGHT POPLITEAL SPONT: 1
BH CV LOW VAS RIGHT POSTERIOR TIBIAL VESSEL: 1
BH CV LOWER VASCULAR LEFT COMMON FEMORAL AUGMENT: NORMAL
BH CV LOWER VASCULAR LEFT COMMON FEMORAL COMPETENT: NORMAL
BH CV LOWER VASCULAR LEFT COMMON FEMORAL COMPRESS: NORMAL
BH CV LOWER VASCULAR LEFT COMMON FEMORAL PHASIC: NORMAL
BH CV LOWER VASCULAR LEFT COMMON FEMORAL SPONT: NORMAL
BH CV LOWER VASCULAR LEFT DISTAL FEMORAL COMPRESS: NORMAL
BH CV LOWER VASCULAR LEFT GASTRONEMIUS COMPRESS: NORMAL
BH CV LOWER VASCULAR LEFT GASTRONEMIUS THROMBUS: NORMAL
BH CV LOWER VASCULAR LEFT GREATER SAPH AK COMPRESS: NORMAL
BH CV LOWER VASCULAR LEFT GREATER SAPH BK COMPRESS: NORMAL
BH CV LOWER VASCULAR LEFT LESSER SAPH COMPRESS: NORMAL
BH CV LOWER VASCULAR LEFT MID FEMORAL AUGMENT: NORMAL
BH CV LOWER VASCULAR LEFT MID FEMORAL COMPETENT: NORMAL
BH CV LOWER VASCULAR LEFT MID FEMORAL COMPRESS: NORMAL
BH CV LOWER VASCULAR LEFT MID FEMORAL PHASIC: NORMAL
BH CV LOWER VASCULAR LEFT MID FEMORAL SPONT: NORMAL
BH CV LOWER VASCULAR LEFT PERONEAL COMPRESS: NORMAL
BH CV LOWER VASCULAR LEFT POPLITEAL AUGMENT: NORMAL
BH CV LOWER VASCULAR LEFT POPLITEAL COMPETENT: NORMAL
BH CV LOWER VASCULAR LEFT POPLITEAL COMPRESS: NORMAL
BH CV LOWER VASCULAR LEFT POPLITEAL PHASIC: NORMAL
BH CV LOWER VASCULAR LEFT POPLITEAL SPONT: NORMAL
BH CV LOWER VASCULAR LEFT POSTERIOR TIBIAL COMPRESS: NORMAL
BH CV LOWER VASCULAR LEFT POSTERIOR TIBIAL THROMBUS: NORMAL
BH CV LOWER VASCULAR LEFT PROXIMAL FEMORAL COMPRESS: NORMAL
BH CV LOWER VASCULAR LEFT SAPHENOFEMORAL JUNCTION COMPRESS: NORMAL
BH CV LOWER VASCULAR LEFT SAPHENOFEMORAL JUNCTION PHASIC: NORMAL
BH CV LOWER VASCULAR LEFT SAPHENOFEMORAL JUNCTION SPONT: NORMAL
BH CV LOWER VASCULAR RIGHT COMMON FEMORAL AUGMENT: NORMAL
BH CV LOWER VASCULAR RIGHT COMMON FEMORAL COMPETENT: NORMAL
BH CV LOWER VASCULAR RIGHT COMMON FEMORAL COMPRESS: NORMAL
BH CV LOWER VASCULAR RIGHT COMMON FEMORAL PHASIC: NORMAL
BH CV LOWER VASCULAR RIGHT COMMON FEMORAL SPONT: NORMAL
BH CV LOWER VASCULAR RIGHT DISTAL FEMORAL COMPRESS: NORMAL
BH CV LOWER VASCULAR RIGHT GASTRONEMIUS COMPRESS: NORMAL
BH CV LOWER VASCULAR RIGHT GASTRONEMIUS THROMBUS: NORMAL
BH CV LOWER VASCULAR RIGHT GREATER SAPH AK COMPRESS: NORMAL
BH CV LOWER VASCULAR RIGHT GREATER SAPH BK COMPRESS: NORMAL
BH CV LOWER VASCULAR RIGHT LESSER SAPH COMPRESS: NORMAL
BH CV LOWER VASCULAR RIGHT LESSER SAPH THROMBUS: NORMAL
BH CV LOWER VASCULAR RIGHT MID FEMORAL AUGMENT: NORMAL
BH CV LOWER VASCULAR RIGHT MID FEMORAL COMPETENT: NORMAL
BH CV LOWER VASCULAR RIGHT MID FEMORAL COMPRESS: NORMAL
BH CV LOWER VASCULAR RIGHT MID FEMORAL PHASIC: NORMAL
BH CV LOWER VASCULAR RIGHT MID FEMORAL SPONT: NORMAL
BH CV LOWER VASCULAR RIGHT PERONEAL COMPRESS: NORMAL
BH CV LOWER VASCULAR RIGHT PERONEAL THROMBUS: NORMAL
BH CV LOWER VASCULAR RIGHT POPLITEAL COMPRESS: NORMAL
BH CV LOWER VASCULAR RIGHT POPLITEAL PHASIC: NORMAL
BH CV LOWER VASCULAR RIGHT POPLITEAL SPONT: NORMAL
BH CV LOWER VASCULAR RIGHT POPLITEAL THROMBUS: NORMAL
BH CV LOWER VASCULAR RIGHT POSTERIOR TIBIAL COMPRESS: NORMAL
BH CV LOWER VASCULAR RIGHT POSTERIOR TIBIAL THROMBUS: NORMAL
BH CV LOWER VASCULAR RIGHT PROXIMAL FEMORAL COMPRESS: NORMAL
BH CV LOWER VASCULAR RIGHT SAPHENOFEMORAL JUNCTION COMPRESS: NORMAL
BH CV LOWER VASCULAR RIGHT SAPHENOFEMORAL JUNCTION PHASIC: NORMAL
BH CV LOWER VASCULAR RIGHT SAPHENOFEMORAL JUNCTION SPONT: NORMAL
GLUCOSE BLDC GLUCOMTR-MCNC: 162 MG/DL (ref 70–130)
GLUCOSE BLDC GLUCOMTR-MCNC: 218 MG/DL (ref 70–130)
GLUCOSE BLDC GLUCOMTR-MCNC: 222 MG/DL (ref 70–130)
GLUCOSE BLDC GLUCOMTR-MCNC: 277 MG/DL (ref 70–130)
TROPONIN T SERPL-MCNC: 0.02 NG/ML (ref 0–0.03)
TROPONIN T SERPL-MCNC: 0.03 NG/ML (ref 0–0.03)

## 2018-05-19 PROCEDURE — 63710000001 INSULIN ASPART PER 5 UNITS: Performed by: INTERNAL MEDICINE

## 2018-05-19 PROCEDURE — 84484 ASSAY OF TROPONIN QUANT: CPT | Performed by: INTERNAL MEDICINE

## 2018-05-19 PROCEDURE — 25010000002 LORAZEPAM PER 2 MG: Performed by: INTERNAL MEDICINE

## 2018-05-19 PROCEDURE — 80061 LIPID PANEL: CPT | Performed by: NURSE PRACTITIONER

## 2018-05-19 PROCEDURE — 97110 THERAPEUTIC EXERCISES: CPT | Performed by: PHYSICAL THERAPIST

## 2018-05-19 PROCEDURE — 82962 GLUCOSE BLOOD TEST: CPT

## 2018-05-19 PROCEDURE — 99221 1ST HOSP IP/OBS SF/LOW 40: CPT | Performed by: INTERNAL MEDICINE

## 2018-05-19 PROCEDURE — 93005 ELECTROCARDIOGRAM TRACING: CPT | Performed by: INTERNAL MEDICINE

## 2018-05-19 PROCEDURE — 93970 EXTREMITY STUDY: CPT

## 2018-05-19 PROCEDURE — 93010 ELECTROCARDIOGRAM REPORT: CPT | Performed by: INTERNAL MEDICINE

## 2018-05-19 RX ORDER — LEVETIRACETAM 100 MG/ML
500 SOLUTION ORAL EVERY 12 HOURS SCHEDULED
Status: DISCONTINUED | OUTPATIENT
Start: 2018-05-19 | End: 2018-05-19

## 2018-05-19 RX ORDER — LEVETIRACETAM 100 MG/ML
500 SOLUTION ORAL EVERY 12 HOURS SCHEDULED
Status: DISCONTINUED | OUTPATIENT
Start: 2018-05-19 | End: 2018-05-23 | Stop reason: HOSPADM

## 2018-05-19 RX ADMIN — LORAZEPAM 1 MG: 2 INJECTION INTRAMUSCULAR; INTRAVENOUS at 20:24

## 2018-05-19 RX ADMIN — INSULIN ASPART 3 UNITS: 100 INJECTION, SOLUTION INTRAVENOUS; SUBCUTANEOUS at 09:44

## 2018-05-19 RX ADMIN — LOSARTAN POTASSIUM 50 MG: 50 TABLET, FILM COATED ORAL at 09:43

## 2018-05-19 RX ADMIN — INSULIN ASPART 5 UNITS: 100 INJECTION, SOLUTION INTRAVENOUS; SUBCUTANEOUS at 23:31

## 2018-05-19 RX ADMIN — QUETIAPINE FUMARATE 25 MG: 25 TABLET, FILM COATED ORAL at 09:43

## 2018-05-19 RX ADMIN — QUETIAPINE FUMARATE 25 MG: 25 TABLET, FILM COATED ORAL at 20:23

## 2018-05-19 RX ADMIN — INSULIN ASPART 8 UNITS: 100 INJECTION, SOLUTION INTRAVENOUS; SUBCUTANEOUS at 12:36

## 2018-05-19 RX ADMIN — INSULIN ASPART 5 UNITS: 100 INJECTION, SOLUTION INTRAVENOUS; SUBCUTANEOUS at 17:08

## 2018-05-19 RX ADMIN — LEVETIRACETAM 500 MG: 500 TABLET, FILM COATED ORAL at 09:43

## 2018-05-19 RX ADMIN — Medication 100 MG: at 09:43

## 2018-05-19 RX ADMIN — DOCUSATE SODIUM 100 MG: 100 CAPSULE, LIQUID FILLED ORAL at 09:43

## 2018-05-19 RX ADMIN — LEVETIRACETAM 500 MG: 100 SOLUTION ORAL at 20:24

## 2018-05-19 RX ADMIN — FOLIC ACID 1 MG: 1 TABLET ORAL at 09:43

## 2018-05-19 NOTE — PROGRESS NOTES
"  Daily Progress Note.   [address]  5/19/2018    Patient:  Name:  Angela Carney  MRN:  [id number]  1940  78 y.o.  female         Interval History:  Follow up traumatic subdural hematoma with history of CVA on Plavix and aspirin  Some description of chest pain this am, intermittent. Hard to get more clinical information as she is trying to climb out of her bed currently and is naked having taken off her gown.    Physical Exam:  /72 (BP Location: Right arm, Patient Position: Lying)   Pulse 103   Temp 98.2 °F (36.8 °C) (Oral)   Resp 18   Ht 162.6 cm (64\")   Wt 72.9 kg (160 lb 11.5 oz)   SpO2 96%   BMI 27.59 kg/m²   Body mass index is 27.59 kg/m².    Intake/Output Summary (Last 24 hours) at 05/19/18 1458  Last data filed at 05/19/18 1200   Gross per 24 hour   Intake              240 ml   Output                0 ml   Net              240 ml     GENERAL:  Ill but not toixic, confused  HEENT:  EOMI, nonicteric sclera, no JVD  PULMONARY:    CTAB, no wheeze, no crackles, no rhonchi, symmetric air entry  CARDIAC:  RRR no MRG, S1 S2  ABD: SNTND BS+  EXT: no c/c/e, pulses symmetric 2+ bilaterally  NEURO:  CNs II-XII intact, moving around bed putting her leg through the neck hole of her hospital gown.   SKIN: no lesions, no rash  PSYCH: confused slightly agitated    Data Review:  Notable Labs:    Results from last 7 days  Lab Units 05/17/18  0550   WBC 10*3/mm3 6.93   HEMOGLOBIN g/dL 9.2*   PLATELETS 10*3/mm3 264     Results from last 7 days  Lab Units 05/17/18  0550   SODIUM mmol/L 139   POTASSIUM mmol/L 5.3*   CHLORIDE mmol/L 102   CO2 mmol/L 23.3   BUN mg/dL 25*   CREATININE mg/dL 1.07*   GLUCOSE mg/dL 178*   CALCIUM mg/dL 9.1   Estimated Creatinine Clearance: 43.1 mL/min (A) (by C-G formula based on SCr of 1.07 mg/dL (H)).    Imaging:      Scheduled meds:      docusate sodium 100 mg Oral Daily   folic acid 1 mg Oral Daily   insulin aspart 0-14 Units Subcutaneous 4x Daily With Meals & " Nightly   levETIRAcetam 500 mg Oral Q12H   losartan 50 mg Oral Daily   QUEtiapine 25 mg Oral Q12H   thiamine 100 mg Oral Daily       ASSESSMENT  /  PLAN:  Traumatic subdural hematoma  Status post fall  Altered mental status  History of CVA on Plavix and aspirin  Calf vein thrombosis  Diabetes mellitus  Elevated LFTs  Hypertension  Dementia     PLAN:  Mental status definitely improved but still agitated  Continue Seroquel for now  Switch to oral medications  Calf vein thrombosis with contraindication to anticoagulation or antiplatelets per neurosurgery - repeat LE duplex for calf vein thrombosis - givne AC contraindication may need to consider IVC filter - will see what duplex shows  Diet per speech  Ambulate with physical therapy    Cody De Guzman MD  [facility]  05/19/18  2:58 PM

## 2018-05-19 NOTE — SIGNIFICANT NOTE
05/19/18 1047   Rehab Time/Intention   Evaluation Not Performed unable to evaluate, medical status change  (EKG and troponin ordered due c/o chest pain per nsg 1017)   Rehab Treatment   Discipline occupational therapist

## 2018-05-19 NOTE — PLAN OF CARE
Problem: Patient Care Overview  Goal: Plan of Care Review  Outcome: Ongoing (interventions implemented as appropriate)   05/19/18 8688   Coping/Psychosocial   Plan of Care Reviewed With patient   Plan of Care Review   Progress declining   OTHER   Outcome Summary Patient very confused, unable to safely attempt sitting EOB or transfers due to rigidity and confusion. Patient began pulling at lines...RN notified

## 2018-05-19 NOTE — PROGRESS NOTES
BLEV doppler completed. Prelim is positive for acute Rt DVT and acute inessa CVT called to Marlena NAVARRO.

## 2018-05-19 NOTE — THERAPY TREATMENT NOTE
Acute Care - Physical Therapy Treatment Note   [facility]     Patient Name: Angela Carney  : 1940  MRN: [id number]  Today's Date: 2018  Onset of Illness/Injury or Date of Surgery: 18          Admit Date: 2018    Visit Dx:    ICD-10-CM ICD-9-CM   1. Subdural bleeding I62.00 432.1   2. Generalized weakness R53.1 780.79     Patient Active Problem List   Diagnosis   • Subdural bleeding   • Intracranial hematoma   • Frequent falls   • Dementia       Therapy Treatment          Rehabilitation Treatment Summary     Row Name 18 154             Treatment Time/Intention    Discipline physical therapist  -      Document Type therapy note (daily note)  -MALA      Subjective Information complains of;pain  -      Mode of Treatment physical therapy  -      Patient Effort poor  -      Comment patient very confused and disoriented  -KH      Recorded by [KH] Debbie Dillard, PT 18 154      Row Name 18 154             Cognitive Assessment/Intervention- PT/OT    Follows Commands (Cognition) does not follow one step commands;delayed response/completion;increased processing time needed  -MALA      Cognitive Function (Cognitive) safety deficit;memory deficit  -      Safety Deficit (Cognitive) moderate deficit  -      Personal Safety Interventions nonskid shoes/slippers when out of bed;supervised activity;fall prevention program maintained;gait belt  -KH      Recorded by [KH] Debbie Dillard, PT 18 154      Row Name 18 154             Cognitive Assessment Intervention- SLP    Cognitive Function (Cognition) moderate impairment;severe impairment  -      Orientation Status (Cognition) severe impairment  -KH      Recorded by [KH] Debbie Dillard, PT 18 154      Row Name 18 154             Bed Mobility Assessment/Treatment    Bed Mobility Assessment/Treatment rolling left;rolling right;scooting/bridging  -MALA      Rolling Left [place] (Bed Mobility) dependent (less  than 25% patient effort)  -KH      Rolling Right [place] (Bed Mobility) dependent (less than 25% patient effort)  -KH      Scooting/Bridging [place] (Bed Mobility) dependent (less than 25% patient effort)  -KH      Comment (Bed Mobility) unable to sit up in bed- patient too confused and rigid to safely attempt without causing injury to her neck/shoulders  -KH      Recorded by [KH] Debbie Dillard, PT 05/19/18 1547      Row Name 05/19/18 1542             Transfer Assessment/Treatment    Bed-Chair [place] (Transfers) not tested;unable to assess  -KH      Recorded by [KH] Debbie Dillard, PT 05/19/18 1547      Row Name 05/19/18 1542             Gait/Stairs Assessment/Training    [place] Level (Gait) not tested;unable to assess  -KH      Recorded by [KH] Debbie Dillard, PT 05/19/18 1547      Row Name 05/19/18 1542             Positioning and Restraints    Pre-Treatment Position in bed  -KH      Post Treatment Position bed  -KH      In Bed supine;notified nsg;call light within reach;encouraged to call for assist;exit alarm on  -KH      Recorded by [KH] Debbie Dillard, PT 05/19/18 1547      Row Name                Wound 05/16/18 1230 sacral spine pressure injury    Wound - Properties Group Date first assessed: 05/16/18 [MC] Time first assessed: 1230 [MC] Present On Admission : yes [MC] Location: sacral spine [MC] Type: pressure injury [MC] Stage, Pressure Injury: deep tissue injury [MC] Recorded by:  [MC] Douglas Fernández RN 05/16/18 1847    Row Name                Wound 05/17/18 0745 Left lateral other (see notes) abrasion    Wound - Properties Group Date first assessed: 05/17/18 [BS] Time first assessed: 0745 [BS] Present On Admission : yes [BS] Side: Left [BS] Orientation: lateral [BS] Location: other (see notes) [BS], shoulder  Type: abrasion [BS] Recorded by:  [BS] Fatimah Storm RN 05/17/18 1335      User Key  (r) = Recorded By, (t) = Taken By, (c) = Cosigned By    Initials Name Effective Dates  Discipline     Douglas Fernández, RN 06/16/16 -  Nurse    DYAN Storm, RN 06/16/16 -  Nurse    MALA Dillard, PT 06/22/16 -  PT          Wound 05/16/18 1230 sacral spine pressure injury (Active)   Dressing Appearance dry;intact 5/19/2018  9:32 AM   Base pink 5/19/2018  9:32 AM   Periwound excoriated 5/19/2018  9:32 AM   Drainage Amount none 5/19/2018  9:32 AM   Dressing Care, Wound non-adherent 5/19/2018  9:32 AM   Periwound Care, Wound barrier ointment applied 5/19/2018  9:32 AM       Wound 05/17/18 0745 Left lateral other (see notes) abrasion (Active)   Dressing Appearance dry;intact 5/19/2018  9:32 AM   Base pink 5/19/2018  9:32 AM   Periwound pink;edematous;ecchymotic 5/19/2018  9:32 AM   Drainage Characteristics/Odor sanguineous 5/19/2018  9:32 AM   Drainage Amount small 5/19/2018  9:32 AM   Dressing Care, Wound low-adherent 5/19/2018  9:32 AM       Wound Left lateral elbow skin tear (Active)   Dressing Appearance dry;intact 5/19/2018  9:32 AM   Base pink 5/19/2018  9:32 AM   Periwound ecchymotic 5/19/2018  9:32 AM   Dressing Care, Wound low-adherent 5/19/2018  9:32 AM             Physical Therapy Education     Title: PT OT SLP Therapies (Active)     Topic: Physical Therapy (Active)     Point: Mobility training (Active)    Learning Progress Summary     Learner Status Readiness Method Response Comment Documented by    Patient Active Acceptance E NR,NL   05/19/18 1548     Active Acceptance E,D NR,NL   05/18/18 1139     Done Acceptance E VU,NR,NL   05/17/18 1335     Done Acceptance E,TB,SVETLANA VU,NR   05/17/18 1311    Family Done Acceptance E VU,NR,NL   05/17/18 1335          Point: Home exercise program (Active)    Learning Progress Summary     Learner Status Readiness Method Response Comment Documented by    Patient Active Acceptance E,D NR,NL   05/18/18 1139     Done Acceptance E VU,NR,[facility] [place] 05/17/18 1335     Done Acceptance E,TB,D VU,NR   05/17/18 1311    Family Done Acceptance E MARIO,CHAU,NL    05/17/18 1335          Point: Body mechanics (Active)    Learning Progress Summary     Learner Status Readiness Method Response Comment Documented by    Patient Active Acceptance E NR,NL   05/19/18 1548     Active Acceptance E,D NR,NL   05/18/18 1139     Done Acceptance E VU,NR,NL   05/17/18 1335     Done Acceptance E,TB,D VU,NR   05/17/18 1311    Family Done Acceptance E VU,NR,NL   05/17/18 1335          Point: Precautions (Active)    Learning Progress Summary     Learner Status Readiness Method Response Comment Documented by    Patient Active Acceptance E NR,NL   05/19/18 1548     Active Acceptance E,D NR,NL   05/18/18 1139     Done Acceptance E VU,NR,NL   05/17/18 1335     Done Acceptance E,TB,D VU,NR   05/17/18 1311    Family Done Acceptance E VU,NR,NL   05/17/18 1335                      User Key     Initials Effective Dates Name Provider Type Discipline     04/13/15 -  Krystal Pickard MS CCC-SLP Speech and Language Pathologist SLP     04/03/18 -  Andressa Musa, PT Physical Therapist PT     04/03/18 -  Yola Buckner, PT Physical Therapist PT     06/22/16 -  Debbie Dillard, PT Physical Therapist PT                    PT Recommendation and Plan     Plan of Care Reviewed With: patient  Progress: declining  Outcome Summary: Patient very confused, unable to safely attempt sitting EOB or transfers due to rigidity and confusion. Patient began pulling at lines...RN notified          Outcome Measures     Row Name 05/19/18 1500 05/18/18 1100 05/17/18 1300       How much help from another person do you currently need...    Turning from your back to your side while in flat bed without using bedrails? 1  -KH 2  -SV 2  -CH    Moving from lying on back to sitting on the side of a flat bed without bedrails? 1  -KH 2  -SV 2  -CH    Moving to and from a bed to a chair (including a wheelchair)? 1  -KH 2  -SV 1  -CH    Standing up from a chair using your arms (e.g., wheelchair, bedside chair)? 1  -KH  2  -SV 1  -CH    Climbing 3-5 steps with a railing? 1  -KH 1  -SV 1  -CH    To walk in hospital room? 1  -KH 1  -SV 1  -CH    AM-PAC 6 Clicks Score 6  -KH 10  -SV 8  -CH       Functional Assessment    Outcome Measure Options AM-PAC 6 Clicks Basic Mobility (PT)  -KH AM-PAC 6 Clicks Basic Mobility (PT)  -SV AM-PAC 6 Clicks Basic Mobility (PT)  -CH      User Key  (r) = Recorded By, (t) = Taken By, (c) = Cosigned By    Initials Name Provider Type     Andressa Musa, PT Physical Therapist    SV Yola Buckner, PT Physical Therapist    MALA Dillard, PT Physical Therapist           Time Calculation:         PT Charges     Row Name 05/19/18 1555             Time Calculation    Start Time 1504  -      Stop Time 1518  -      Time Calculation (min) 14 min  -      PT Received On 05/19/18  -      PT - Next Appointment 05/20/18  -        User Key  (r) = Recorded By, (t) = Taken By, (c) = Cosigned By    Initials Name Provider Type    MALA Dillard, PT Physical Therapist          Therapy Charges for Today     Code Description Service Date Service Provider Modifiers Qty    [id number] HC PT THER PROC EA 15 MIN 5/19/2018 Debbie Dillard, PT GP 1          PT G-Codes  Outcome Measure Options: AM-PAC 6 Clicks Basic Mobility (PT)    Debbie Dillard, PT  5/19/2018

## 2018-05-19 NOTE — CONSULTS
Patient Name: Angela Carney  :1940  78 y.o.    Date of Admission: 2018  Date of Consultation:  18  Encounter Provider: Kari Sy RN EXTENDER  Place of Service: [facility] CARDIOLOGY  Referring Provider: Charles Sagastume MD  Patient Care Team:  Ezra Krishnamurthy MD as PCP - General (Internal Medicine)      Chief complaint:    History of Present Illness:  Ms. Carney is a 78 year old woman with history of dementia. She had a fall in April and CT head at that time was negative. She was discharged to nursing home and had an unwitnessed fall; she was admitted to [facility] where CT head showed left subdural hematoma with moderate midline shift and new left occipital hemorrhage concerning for subacute bleed. She was transferred to [facility] for further neurological workup. During admission, her neurological status has improved. She was found to have BLE acute DVTs but is not a candidate for anticoagulation.     She did complain of chest pain today. Preliminary EKG shows sinus tachycardia; most recent . Her baseline HR has been 60-110s. Her most recent /54. I have called [facility] for previous EKG for comparison. Troponin is 0.0207. On admission, BUN 25 with Crea 1.07.     Patient unable to communicate well history obtained from the old charts  Cardiac Testing:  BLE Venous Doppler 18  Interpretation Summary     · Acute right lower extremity deep vein thrombosis noted in the peroneal.  · Acute left lower extremity deep vein thrombosis noted in the gastrocnemius/soleal.  · All other veins appeared normal bilaterally.         Past Medical History:   Diagnosis Date   • Dementia    • Diabetes mellitus    • Frequent UTI    • Stroke        History reviewed. No pertinent surgical history.      Prior to Admission medications    Medication Sig Start Date End Date Taking? Authorizing Provider   ammonium lactate (AMLACTIN) 12 % cream  Apply 1 application topically 2 (Two) Times a Day.   Yes Historical Provider, MD   aspirin 81 MG chewable tablet Chew 81 mg Daily.   Yes Historical Provider, MD   clopidogrel (PLAVIX) 75 MG tablet Take 75 mg by mouth Daily.   Yes Historical Provider, MD   Cranberry 250 MG capsule Take 1 capsule by mouth Daily.   Yes Historical Provider, MD   docusate sodium (COLACE) 100 MG capsule Take 100 mg by mouth 2 (Two) Times a Day.   Yes Historical Provider, MD   fluticasone (VERAMYST) 27.5 MCG/SPRAY nasal spray 1 spray into each nostril Daily.   Yes Historical Provider, MD   folic acid (FOLVITE) 1 MG tablet Take 1 mg by mouth Daily.   Yes Historical Provider, MD   insulin glargine (LANTUS) 100 UNIT/ML injection Inject 20 Units under the skin Every Morning.   Yes Historical Provider, MD   insulin glargine (LANTUS) 100 UNIT/ML injection Inject 10 Units under the skin every night at bedtime.   Yes Historical Provider, MD   insulin lispro (humaLOG) 100 UNIT/ML injection Inject  under the skin 4 (Four) Times a Day With Meals & at Bedtime.   Yes Historical Provider, MD   latanoprost (XALATAN) 0.005 % ophthalmic solution Administer 1 drop to both eyes Every Night.   Yes Historical Provider, MD   LORazepam (ATIVAN) 0.5 MG tablet Take 0.5 mg by mouth 3 (Three) Times a Day.   Yes Historical Provider, MD   losartan (COZAAR) 50 MG tablet Take 50 mg by mouth Daily.   Yes Historical Provider, MD   metFORMIN (GLUCOPHAGE) 1000 MG tablet Take 1,000 mg by mouth 2 (Two) Times a Day With Meals.   Yes Historical Provider, MD   nitrofurantoin, macrocrystal-monohydrate, (MACROBID) 100 MG capsule Take 100 mg by mouth 2 (Two) Times a Day. 5/14/18 5/21/18 Yes Historical Provider, MD   polyethylene glycol (MIRALAX) packet Take 17 g by mouth Daily.   Yes Historical Provider, MD   risperiDONE (risperDAL) 1 MG tablet Take 1 mg by mouth 3 (Three) Times a Day.   Yes Historical Provider, MD   thiamine (VITAMIN B-1) 100 MG tablet Take 100 mg by mouth 3  (Three) Times a Day.   Yes Historical Provider, MD   timolol (TIMOPTIC) 0.5 % ophthalmic solution Administer 1 drop to both eyes 2 (Two) Times a Day.   Yes Historical Provider, MD       No Known Allergies    Social History     Social History   • Marital status: Single     Social History Main Topics   • Smoking status: Never Smoker   • Smokeless tobacco: Never Used   • Drug use: Unknown     Other Topics Concern   • Not on file       History reviewed. No pertinent family history.    REVIEW OF SYSTEMS:   Patient unable to communicate to give review of other systems      Objective:     Vitals:    05/18/18 1838 05/18/18 2230 05/19/18 0636 05/19/18 1009   BP: 150/73 162/85 159/85 133/54   BP Location: Right arm Right arm Right arm Right arm   Patient Position: Lying Lying Lying Lying   Pulse: 114 114 111 113   Resp: 18 18 18   Temp: 97.9 °F (36.6 °C)  98.4 °F (36.9 °C) 97.6 °F (36.4 °C)   TempSrc: Oral  Oral Oral   SpO2: 97% 94% 98% 96%   Weight:       Height:         Body mass index is 27.59 kg/m².    General Appearance:    Alert, cooperative, in no acute distress   Head:    Normocephalic, without obvious abnormality, atraumatic   Eyes:            Lids and lashes normal, conjunctivae and sclerae normal, no   icterus, no pallor, corneas clear, PERRLA   Ears:    Ears appear intact with no abnormalities noted   Throat:   No oral lesions, no thrush, oral mucosa moist   Neck:   No adenopathy, supple, trachea midline, no thyromegaly, no   carotid bruit, no JVD   Back:     No kyphosis present, no scoliosis present, no skin lesions, erythema or scars, no tenderness to percussion or palpation, range of motion normal   Lungs:     Clear to auscultation,respirations regular, even and unlabored    Heart:    Regular rhythm and normal rate, normal S1 and S2, no murmur, no gallop, no rub, no click   Chest Wall:    No abnormalities observed   Abdomen:     Normal bowel sounds, no masses, no organomegaly, soft        non-tender,  non-distended, no guarding, no rebound  tenderness   Extremities:   Moves all extremities well, no edema, no cyanosis, no redness   Pulses:   Pulses palpable and equal bilaterally. Normal radial, carotid, femoral, dorsalis pedis and posterior tibial pulses bilaterally. Normal abdominal aorta   Skin:  Psychiatric:   No bleeding, bruising or rash    Alert and oriented x 3, normal mood and affect   Lab Review:       Results from last 7 days  Lab Units 05/18/18  0415 05/17/18  0550   SODIUM mmol/L  --  139   POTASSIUM mmol/L  --  5.3*   CHLORIDE mmol/L  --  102   CO2 mmol/L  --  23.3   BUN mg/dL  --  25*   CREATININE mg/dL  --  1.07*   CALCIUM mg/dL  --  9.1   BILIRUBIN mg/dL 0.7 0.5   ALK PHOS U/L 83 85   ALT (SGPT) U/L 56* 70*   AST (SGOT) U/L 28 35*   GLUCOSE mg/dL  --  178*       Results from last 7 days  Lab Units 05/19/18  1036   TROPONIN T ng/mL 0.027       Results from last 7 days  Lab Units 05/17/18  0550   WBC 10*3/mm3 6.93   HEMOGLOBIN g/dL 9.2*   HEMATOCRIT % 28.8*   PLATELETS 10*3/mm3 264       Results from last 7 days  Lab Units 05/17/18  0832   INR  1.02   APTT seconds 25.7                        EKG:      I personally viewed and interpreted the patient's EKG/Telemetry data.        Assessment and Plan:       78-year-old white female with dementia and fall with subdural hematoma, unable to do proper history    Repeat EKGs, troponin and echocardiogram    Vital signs within normal limits    Aspirin and Plavix will remain on hold    poor prognosis    Deirdre Horner MD  05/19/18  1:03 PM

## 2018-05-19 NOTE — PLAN OF CARE
Problem: Patient Care Overview  Goal: Plan of Care Review  Outcome: Ongoing (interventions implemented as appropriate)   05/19/18 6500   Coping/Psychosocial   Plan of Care Reviewed With patient   Plan of Care Review   Progress no change   OTHER   Outcome Summary Patient Alert, oriented self only. Cooperating but not always able to follow commands. Eating w/ assistance. C/o chest pain this AM. Stat EKG done w/ sinus tachycardia. Troponin negative. Cardiology to see. Continue to monitor, bed at [facility] in [place], [place] once cleared by MD.

## 2018-05-20 LAB
BASOPHILS # BLD AUTO: 0.03 10*3/MM3 (ref 0–0.2)
BASOPHILS NFR BLD AUTO: 0.3 % (ref 0–1.5)
CHOLEST SERPL-MCNC: 185 MG/DL (ref 0–200)
DEPRECATED RDW RBC AUTO: 48.7 FL (ref 37–54)
EOSINOPHIL # BLD AUTO: 0.26 10*3/MM3 (ref 0–0.7)
EOSINOPHIL NFR BLD AUTO: 2.9 % (ref 0.3–6.2)
ERYTHROCYTE [DISTWIDTH] IN BLOOD BY AUTOMATED COUNT: 13 % (ref 11.7–13)
GLUCOSE BLDC GLUCOMTR-MCNC: 207 MG/DL (ref 70–130)
GLUCOSE BLDC GLUCOMTR-MCNC: 213 MG/DL (ref 70–130)
GLUCOSE BLDC GLUCOMTR-MCNC: 368 MG/DL (ref 70–130)
HCT VFR BLD AUTO: 30.1 % (ref 35.6–45.5)
HDLC SERPL-MCNC: 40 MG/DL (ref 40–60)
HGB BLD-MCNC: 9.7 G/DL (ref 11.9–15.5)
IMM GRANULOCYTES # BLD: 0.02 10*3/MM3 (ref 0–0.03)
IMM GRANULOCYTES NFR BLD: 0.2 % (ref 0–0.5)
LDLC SERPL CALC-MCNC: 123 MG/DL (ref 0–100)
LDLC/HDLC SERPL: 3.08 {RATIO}
LYMPHOCYTES # BLD AUTO: 1.09 10*3/MM3 (ref 0.9–4.8)
LYMPHOCYTES NFR BLD AUTO: 12 % (ref 19.6–45.3)
MCH RBC QN AUTO: 33.2 PG (ref 26.9–32)
MCHC RBC AUTO-ENTMCNC: 32.2 G/DL (ref 32.4–36.3)
MCV RBC AUTO: 103.1 FL (ref 80.5–98.2)
MONOCYTES # BLD AUTO: 1.51 10*3/MM3 (ref 0.2–1.2)
MONOCYTES NFR BLD AUTO: 16.6 % (ref 5–12)
NEUTROPHILS # BLD AUTO: 6.2 10*3/MM3 (ref 1.9–8.1)
NEUTROPHILS NFR BLD AUTO: 68 % (ref 42.7–76)
PLATELET # BLD AUTO: 273 10*3/MM3 (ref 140–500)
PMV BLD AUTO: 9.8 FL (ref 6–12)
RBC # BLD AUTO: 2.92 10*6/MM3 (ref 3.9–5.2)
TRIGL SERPL-MCNC: 109 MG/DL (ref 0–150)
VLDLC SERPL-MCNC: 21.8 MG/DL (ref 5–40)
WBC NRBC COR # BLD: 9.11 10*3/MM3 (ref 4.5–10.7)

## 2018-05-20 PROCEDURE — 99231 SBSQ HOSP IP/OBS SF/LOW 25: CPT | Performed by: NURSE PRACTITIONER

## 2018-05-20 PROCEDURE — 63710000001 INSULIN ASPART PER 5 UNITS: Performed by: INTERNAL MEDICINE

## 2018-05-20 PROCEDURE — 25010000002 LORAZEPAM PER 2 MG: Performed by: INTERNAL MEDICINE

## 2018-05-20 PROCEDURE — 82962 GLUCOSE BLOOD TEST: CPT

## 2018-05-20 PROCEDURE — 85025 COMPLETE CBC W/AUTO DIFF WBC: CPT | Performed by: INTERNAL MEDICINE

## 2018-05-20 PROCEDURE — 97110 THERAPEUTIC EXERCISES: CPT | Performed by: PHYSICAL THERAPIST

## 2018-05-20 RX ADMIN — INSULIN ASPART 5 UNITS: 100 INJECTION, SOLUTION INTRAVENOUS; SUBCUTANEOUS at 08:45

## 2018-05-20 RX ADMIN — LOSARTAN POTASSIUM 50 MG: 50 TABLET, FILM COATED ORAL at 08:46

## 2018-05-20 RX ADMIN — FOLIC ACID 1 MG: 1 TABLET ORAL at 08:45

## 2018-05-20 RX ADMIN — Medication 100 MG: at 08:46

## 2018-05-20 RX ADMIN — LORAZEPAM 1 MG: 2 INJECTION INTRAMUSCULAR; INTRAVENOUS at 20:13

## 2018-05-20 RX ADMIN — LEVETIRACETAM 500 MG: 100 SOLUTION ORAL at 20:13

## 2018-05-20 RX ADMIN — QUETIAPINE FUMARATE 25 MG: 25 TABLET, FILM COATED ORAL at 08:46

## 2018-05-20 RX ADMIN — LEVETIRACETAM 500 MG: 100 SOLUTION ORAL at 08:46

## 2018-05-20 RX ADMIN — QUETIAPINE FUMARATE 25 MG: 25 TABLET, FILM COATED ORAL at 20:13

## 2018-05-20 RX ADMIN — INSULIN ASPART 5 UNITS: 100 INJECTION, SOLUTION INTRAVENOUS; SUBCUTANEOUS at 17:36

## 2018-05-20 RX ADMIN — DOCUSATE SODIUM 100 MG: 100 CAPSULE, LIQUID FILLED ORAL at 08:45

## 2018-05-20 RX ADMIN — INSULIN ASPART 12 UNITS: 100 INJECTION, SOLUTION INTRAVENOUS; SUBCUTANEOUS at 11:51

## 2018-05-20 NOTE — PROGRESS NOTES
"  Daily Progress Note.   [address]  5/20/2018    Patient:  Name:  Angela Carney  MRN:  [id number]  1940  78 y.o.  female         Interval History:  Follow up traumatic subdural hematoma with history of CVA on Plavix and aspirin.  Mental status improved from yesterday still slightly confused.  aox 2 this am.  Perseverates on finding rhonda.      Physical Exam:  /69 (BP Location: Right arm, Patient Position: Lying)   Pulse 92   Temp 98.8 °F (37.1 °C) (Oral)   Resp 18   Ht 162.6 cm (64\")   Wt 72.9 kg (160 lb 11.5 oz)   SpO2 97%   BMI 27.59 kg/m²   Body mass index is 27.59 kg/m².    Intake/Output Summary (Last 24 hours) at 05/20/18 1302  Last data filed at 05/20/18 0806   Gross per 24 hour   Intake              600 ml   Output                0 ml   Net              600 ml     GENERAL: non toxic , mild confused  HEENT:  EOMI, nonicteric sclera, no JVD  PULMONARY:    CTAB, no wheeze, no crackles, no rhonchi, symmetric air entry  CARDIAC:  RRR no MRG, S1 S2  ABD: SNTND BS+  EXT: no c/c/e, pulses symmetric 2+ bilaterally  NEURO:  CNs II-XII intact, movement in all ext, wont follow commands  SKIN: no lesions, no rash  PSYCH: confused calm    Data Review:  Notable Labs:    Results from last 7 days  Lab Units 05/17/18  0550   WBC 10*3/mm3 6.93   HEMOGLOBIN g/dL 9.2*   PLATELETS 10*3/mm3 264       Results from last 7 days  Lab Units 05/17/18  0550   SODIUM mmol/L 139   POTASSIUM mmol/L 5.3*   CHLORIDE mmol/L 102   CO2 mmol/L 23.3   BUN mg/dL 25*   CREATININE mg/dL 1.07*   GLUCOSE mg/dL 178*   CALCIUM mg/dL 9.1   Estimated Creatinine Clearance: 43.1 mL/min (A) (by C-G formula based on SCr of 1.07 mg/dL (H)).    Imaging:  dvt reviewed    Scheduled meds:      docusate sodium 100 mg Oral Daily   folic acid 1 mg Oral Daily   insulin aspart 0-14 Units Subcutaneous 4x Daily With Meals & Nightly   levETIRAcetam 500 mg Oral Q12H   losartan 50 mg Oral Daily   QUEtiapine 25 mg Oral Q12H   thiamine " 100 mg Oral Daily       ASSESSMENT  /  PLAN:  Traumatic subdural hematoma  Status post fall  Altered mental status  History of CVA on Plavix and aspirin  BLE DVT  Diabetes mellitus  Elevated LFTs  Hypertension  Dementia     PLAN:  Mental status improved but still agitated  Continue Seroquel for now  BLE thrombosis with contraindication to anticoagulation or antiplatelets per neurosurgery - will consult Vascular surgery for consideration of IVC filter given that NSG has requested no ac and there seems to be some progression from prior duplex scans  Diet per speech recs  Ambulate with physical therapy as possible     Cody De Guzman MD  [facility]  05/20/18  8256

## 2018-05-20 NOTE — NURSING NOTE
Patient's daughter Felicity, also POA, at bedside along with patients . Transport arrived to take patient for Cardiac eval r/t complaints of chest pain previous day. Pt has had no complaints since or s/s of distress.   Family decided against pt having procedure at this time. Continue to monitor.  RAMON Alcocer

## 2018-05-20 NOTE — PLAN OF CARE
Problem: Patient Care Overview  Goal: Plan of Care Review  Ms. Carney demonstrates improved participation with therapy today.  She is very confused and impulsive, requiring regular cuing to stop/slow down/follow instructions.  She requires decreased assistance for bed, sit to/from stand transfers and gait today, however unsafe with ataxic like gait pattern losing balance posterior several times (able to correct with min. A from therapist).  She has trouble following multiple step commands.  She continues to be a good candidate for skilled physical therapy.

## 2018-05-20 NOTE — THERAPY TREATMENT NOTE
Acute Care - Physical Therapy Treatment Note   [facility]     Patient Name: Angela Carney  : 1940  MRN: [id number]  Today's Date: 2018  Onset of Illness/Injury or Date of Surgery: 18          Admit Date: 2018    Visit Dx:    ICD-10-CM ICD-9-CM   1. Subdural bleeding I62.00 432.1   2. Generalized weakness R53.1 780.79     Patient Active Problem List   Diagnosis   • Subdural bleeding   • Intracranial hematoma   • Frequent falls   • Dementia       Therapy Treatment          Rehabilitation Treatment Summary     Row Name 18 1500             Treatment Time/Intention    Discipline physical therapist  -GJ      Document Type therapy note (daily note)  -GJ      Subjective Information no complaints  -GJ      Mode of Treatment physical therapy  -GJ      Patient/Family Observations pt in bed, family presetn  -GJ      Care Plan Review evaluation/treatment results reviewed;risks/benefits reviewed;care plan/treatment goals reviewed;current/potential barriers reviewed;patient/other agree to care plan  -GJ      Care Plan Review, Other Participant(s) daughter  -GJ      Total Minutes, Physical Therapy Treatment 20  -GJ      Therapy Frequency (PT Clinical Impression) daily  -GJ      Patient Effort good  -GJ      Recorded by [GJ] Ricardo Gómez, PT 18 1537      Row Name 18 1500             Cognitive Assessment/Intervention- PT/OT    Follows Commands (Cognition) follows one step commands;50-74% accuracy   pt confused, oriented to person  -GJ      Cognitive Function (Cognitive) safety deficit;memory deficit  -GJ      Safety Deficit (Cognitive) moderate deficit;severe deficit  -GJ      Recorded by [GJ] Ricardo Gómez, PT 18 1537      Row Name 18 1500             Safety Issues, Functional Mobility    Safety Issues Affecting Function (Mobility) ability to follow commands;at risk behavior observed;awareness of need for assistance;impulsivity;insight into deficits/self awareness;safety  precautions follow-through/compliance;safety precaution awareness  -GJ      Recorded by [GJ] Ricardo Gómez, PT 05/20/18 1537      Row Name 05/20/18 1500             Bed Mobility Assessment/Treatment    Supine-Sit Eads (Bed Mobility) minimum assist (75% patient effort);set up;verbal cues;nonverbal cues (demo/gesture)  -GJ      Sit-Supine Eads (Bed Mobility) nonverbal cues (demo/gesture);minimum assist (75% patient effort);set up;verbal cues  -GJ      Bed Mobility, Safety Issues cognitive deficits limit understanding;decreased use of arms for pushing/pulling;decreased use of legs for bridging/pushing  -GJ      Recorded by [GJ] Ricardo Gómez, PT 05/20/18 1537      Row Name 05/20/18 1500             Transfer Assessment/Treatment    Sit-Stand Eads (Transfers) contact guard;minimum assist (75% patient effort);verbal cues;set up;nonverbal cues (demo/gesture)   unsafe, impulsive  -GJ      Recorded by [GJ] Ricardo Gómez, PT 05/20/18 1537      Row Name 05/20/18 1500             Gait/Stairs Assessment/Training    Eads Level (Gait) verbal cues;nonverbal cues (demo/gesture);minimum assist (75% patient effort);2 person assist   bilateral hand held assist  -GJ      Assistive Device (Gait) --   bilateral hand held assist  -GJ      Distance in Feet (Gait) 50  -GJ      Pattern (Gait) step-through   unsafe  -GJ      Deviations/Abnormal Patterns (Gait) ataxic;base of support, wide   loses balance posteriorly  -GJ      Bilateral Gait Deviations weight shift ability decreased  -GJ      Recorded by [GJ] Ricardo Gómez, PT 05/20/18 1537      Row Name 05/20/18 1500             MMT (Manual Muscle Testing)    Additional Documentation --   LAQ, AP, seated marching, hip abd  -GJ      Recorded by [GJ] Ricardo Gómez, PT 05/20/18 1537      Row Name 05/20/18 1500             Positioning and Restraints    Pre-Treatment Position in bed  -GJ      Post Treatment Position bed  -GJ      In Bed notified nsg;supine;call  light within reach;encouraged to call for assist;exit alarm on;side rails up x3  -GJ      Recorded by [GJ] Ricardo Gómez, PT 05/20/18 1537      Row Name                Wound 05/16/18 1230 sacral spine pressure injury    Wound - Properties Group Date first assessed: 05/16/18 [MC] Time first assessed: 1230 [MC] Present On Admission : yes [MC] Location: sacral spine [MC] Type: pressure injury [MC] Stage, Pressure Injury: deep tissue injury [MC] Recorded by:  [MC] Douglas Fernández RN 05/16/18 1847    Row Name                Wound 05/17/18 0745 Left lateral other (see notes) abrasion    Wound - Properties Group Date first assessed: 05/17/18 [BS] Time first assessed: 0745 [BS] Present On Admission : yes [BS] Side: Left [BS] Orientation: lateral [BS] Location: other (see notes) [BS], shoulder  Type: abrasion [BS] Recorded by:  [BS] Fatimah Storm RN 05/17/18 1335      User Key  (r) = Recorded By, (t) = Taken By, (c) = Cosigned By    Initials Name Effective Dates Discipline    GJ Ricardo Gómez, PT 03/07/18 -  PT    TYE Fernández RN 06/16/16 -  Nurse    BS Fatimah Storm RN 06/16/16 -  Nurse          Wound 05/16/18 1230 sacral spine pressure injury (Active)   Dressing Appearance dry;intact;no drainage 5/20/2018  8:22 AM   Base pink 5/20/2018  8:22 AM   Periwound intact 5/20/2018  8:22 AM   Drainage Amount none 5/20/2018  8:22 AM   Dressing Care, Wound dressing changed;hydrocolloid 5/20/2018  8:22 AM   Periwound Care, Wound barrier ointment applied 5/19/2018 10:00 PM       Wound 05/17/18 0745 Left lateral other (see notes) abrasion (Active)   Dressing Appearance dry;intact 5/20/2018  8:22 AM   Base pink 5/19/2018 10:00 PM   Periwound pink 5/20/2018  8:22 AM   Drainage Characteristics/Odor sanguineous 5/19/2018 10:00 PM   Drainage Amount small 5/20/2018  8:22 AM   Dressing Care, Wound low-adherent 5/20/2018  8:22 AM       Wound Left lateral elbow skin tear (Active)   Dressing Appearance dry;intact 5/20/2018   8:22 AM   Base pink 5/20/2018  8:22 AM   Periwound ecchymotic 5/20/2018  8:22 AM   Drainage Amount scant 5/20/2018  8:22 AM   Dressing Care, Wound low-adherent 5/19/2018 10:00 PM             Physical Therapy Education     Title: PT OT SLP Therapies (Done)     Topic: Physical Therapy (Done)     Point: Mobility training (Done)    Learning Progress Summary     Learner Status Readiness Method Response Comment Documented by    Patient Done Acceptance E NR,VU   05/20/18 1537     Active Acceptance E NR,NL   05/19/18 1548     Active Acceptance E,D NR,\[facility]\"" 05/18/18 1139     Done Acceptance E VU,NR,[facility] 05/17/18 1335     Done Acceptance E,TB,D VU,NR   05/17/18 1311    Family Done Acceptance E NR,VU   05/20/18 1537     Done Acceptance E VU,NR,[facility] 05/17/18 1335          Point: Home exercise program (Done)    Learning Progress Summary     Learner Status Readiness Method Response Comment Documented by    Patient Done Acceptance E NR,VU   05/20/18 1537     Active Acceptance E,D NR,\[facility]\"" 05/18/18 1139     Done Acceptance E VU,NR,[facility] 05/17/18 1335     Done Acceptance E,TB,D VU,NR   05/17/18 1311    Family Done Acceptance E NR,VU   05/20/18 1537     Done Acceptance E VU,NR,[facility] 05/17/18 1335          Point: Body mechanics (Done)    Learning Progress Summary     Learner Status Readiness Method Response Comment Documented by    Patient Done Acceptance E NR,VU   05/20/18 1537     Active Acceptance E NR,[facility] 05/19/18 1548     Active Acceptance E,D NR,\[facility]\"" 05/18/18 1139     Done Acceptance E VU,NR,[facility] 05/17/18 1335     Done Acceptance E,TB,D VU,NR   05/17/18 1311    Family Done Acceptance E NR,VU   05/20/18 1537     Done Acceptance E VU,NR,[facility] 05/17/18 1335          Point: Precautions (Done)    Learning Progress Summary     Learner Status Readiness Method Response Comment Documented by    Patient Done Acceptance E NR,VU   05/20/18 1537     Active Acceptance E NR,[facility] 05/19/18 1548     Active  Acceptance E,D NR,NL   05/18/18 1139     Done Acceptance E VU,NR,NL   05/17/18 1335     Done Acceptance E,TB,D VU,NR   05/17/18 1311    Family Done Acceptance E NR,VU   05/20/18 1537     Done Acceptance E VU,NR,NL   05/17/18 1335                      User Key     Initials Effective Dates Name Provider Type Discipline     03/07/18 -  Ricardo Gómez, PT Physical Therapist PT     04/13/15 -  Krystal Pickard MS CCC-SLP Speech and Language Pathologist SLP     04/03/18 -  Andressa Musa, PT Physical Therapist PT     04/03/18 -  Yola Buckner, PT Physical Therapist PT     06/22/16 -  Debbie Dillard, PT Physical Therapist PT                    PT Recommendation and Plan  Therapy Frequency (PT Clinical Impression): daily             Outcome Measures     Row Name 05/20/18 1500 05/19/18 1500 05/18/18 1100       How much help from another person do you currently need...    Turning from your back to your side while in flat bed without using bedrails? 3  -GJ 1  -KH 2  -SV    Moving from lying on back to sitting on the side of a flat bed without bedrails? 3  -GJ 1  -KH 2  -SV    Moving to and from a bed to a chair (including a wheelchair)? 3  -GJ 1  -KH 2  -SV    Standing up from a chair using your arms (e.g., wheelchair, bedside chair)? 3  -GJ 1  -KH 2  -SV    Climbing 3-5 steps with a railing? 3  -GJ 1  -KH 1  -SV    To walk in hospital room? 2  -GJ 1  -KH 1  -SV    AM-PAC 6 Clicks Score 17  -GJ 6  -KH 10  -SV       Functional Assessment    Outcome Measure Options AM-PAC 6 Clicks Basic Mobility (PT)  -GJ AM-PAC 6 Clicks Basic Mobility (PT)  -KH AM-PAC 6 Clicks Basic Mobility (PT)  -SV      User Key  (r) = Recorded By, (t) = Taken By, (c) = Cosigned By    Initials Name Provider Type     Ricardo Gómez, PT Physical Therapist    SV Yola Buckner, PT Physical Therapist     Debbie Dillard, PT Physical Therapist           Time Calculation:         PT Charges     Row Name 05/20/18 1540             Time Calculation     Start Time 1508  -GJ      Stop Time 1535  -GJ      Time Calculation (min) 27 min  -GJ      PT Received On 05/20/18  -GJ      PT - Next Appointment 05/21/18  -GJ        User Key  (r) = Recorded By, (t) = Taken By, (c) = Cosigned By    Initials Name Provider Type    GJ Ricardo Gómez, PT Physical Therapist          Therapy Charges for Today     Code Description Service Date Service Provider Modifiers Qty    66010251666 HC PT THER PROC EA 15 MIN 5/20/2018 Ricardo Gómez, PT GP 2          PT G-Codes  Outcome Measure Options: AM-PAC 6 Clicks Basic Mobility (PT)    Ricardo Gómez, PT  5/20/2018

## 2018-05-20 NOTE — CONSULTS
Name: Angela Carney ADMIT: 2018   : 1940  PCP: Ezra Krishnamurthy MD    MRN: [id number] LOS: 4 days   AGE/SEX: 78 y.o. female  ROOM: [facility]     Patient Care Team:  Ezra Krishnamurthy MD as PCP - General (Internal Medicine)  No chief complaint on file.    CC:Bilateral lower extremity blood clots    Subjective :  The patient is a 78-year-old female with a history of dementia who had a fall on April of this year with head trauma.  A CT scan of the head at that time was negative.  She was transferred to a nursing home where she had an unwitnessed fall.  She was transferred to [facility] and a CT scan showed a left subdural hematoma with moderate midline shift.  There was also a new left occipital hemorrhage.  She was transferred here for further evaluation and treatment.  Her neurologic examination had improved overall but then waxed and waned.  At the time of my exam she was very confused, only oriented to person.  With her family at the bedside this improved somewhat, still talking to family members were not present.    On 18, she had a venous scan which demonstrated a lateral calf vein thromboses.  A repeat scan performed yesterday demonstrated this to be slight progression on the right now involving the lower popliteal vein.  Because of this we were asked to see her.  It has been indicated by the neurosurgery service that no anticoagulation should be given.  Therefore, we were asked to see her for vena cava filter placement.    According to the patient's  and daughter she has not had any previous history of DVT or superficial thrombophlebitis.  No family history of the same.  No history of any bleeding disorder, blood dyscrasia, or hypercoagulable state.    Inpatient Vascular Surgery Consult  Consult performed by: MOHAN VIRGEN JR  Consult ordered by: HANH PATTON        History of Present Illness  Review of Systems:  10 system review of system has been  performed through the daughter and .  Dementia is her biggest issue most recently.  She was on aspirin and Plavix both for a previous history of stroke.  She's had no angina, palpitations or congestive heart failure.  No claudication symptoms of the lower extremities.  No history of COPD or asthma.  Main or the 10 system review of systems have been reviewed and are negative.    Past Medical History:   Diagnosis Date   • Dementia    • Diabetes mellitus    • Frequent UTI    • Stroke      History reviewed. No pertinent surgical history.  History reviewed. No pertinent family history.  Social History   Substance Use Topics   • Smoking status: Never Smoker   • Smokeless tobacco: Never Used   • Alcohol use Not on file     Prescriptions Prior to Admission   Medication Sig Dispense Refill Last Dose   • ammonium lactate (AMLACTIN) 12 % cream Apply 1 application topically 2 (Two) Times a Day.      • aspirin 81 MG chewable tablet Chew 81 mg Daily.      • clopidogrel (PLAVIX) 75 MG tablet Take 75 mg by mouth Daily.      • Cranberry 250 MG capsule Take 1 capsule by mouth Daily.      • docusate sodium (COLACE) 100 MG capsule Take 100 mg by mouth 2 (Two) Times a Day.      • fluticasone (VERAMYST) 27.5 MCG/SPRAY nasal spray 1 spray into each nostril Daily.      • folic acid (FOLVITE) 1 MG tablet Take 1 mg by mouth Daily.      • insulin glargine (LANTUS) 100 UNIT/ML injection Inject 20 Units under the skin Every Morning.      • insulin glargine (LANTUS) 100 UNIT/ML injection Inject 10 Units under the skin every night at bedtime.      • insulin lispro (humaLOG) 100 UNIT/ML injection Inject  under the skin 4 (Four) Times a Day With Meals & at Bedtime.      • latanoprost (XALATAN) 0.005 % ophthalmic solution Administer 1 drop to both eyes Every Night.      • LORazepam (ATIVAN) 0.5 MG tablet Take 0.5 mg by mouth 3 (Three) Times a Day.      • losartan (COZAAR) 50 MG tablet Take 50 mg by mouth Daily.      • metFORMIN (GLUCOPHAGE)  1000 MG tablet Take 1,000 mg by mouth 2 (Two) Times a Day With Meals.      • nitrofurantoin, macrocrystal-monohydrate, (MACROBID) 100 MG capsule Take 100 mg by mouth 2 (Two) Times a Day.      • polyethylene glycol (MIRALAX) packet Take 17 g by mouth Daily.      • risperiDONE (risperDAL) 1 MG tablet Take 1 mg by mouth 3 (Three) Times a Day.      • thiamine (VITAMIN B-1) 100 MG tablet Take 100 mg by mouth 3 (Three) Times a Day.      • timolol (TIMOPTIC) 0.5 % ophthalmic solution Administer 1 drop to both eyes 2 (Two) Times a Day.          docusate sodium 100 mg Oral Daily   folic acid 1 mg Oral Daily   insulin aspart 0-14 Units Subcutaneous 4x Daily With Meals & Nightly   levETIRAcetam 500 mg Oral Q12H   losartan 50 mg Oral Daily   QUEtiapine 25 mg Oral Q12H   thiamine 100 mg Oral Daily        •  dextrose  •  dextrose  •  glucagon (human recombinant)  •  labetalol  •  LORazepam  •  sodium chloride  Patient has no known allergies.    Objective     Physical Exam:  Physical Exam:  Well-developed thin female in no acute distress who is awake but oriented to person only.  She continues to talk to people who are not present in the room.  HEENT: Active with the exception of contusions on the left face both supraorbital and infraorbital.  Extraocular movements are intact.  Neck: Supple, for range of motion, no JVD, no carotid bruits  Heart: Normal sinus rhythm without murmur  Lungs: Clear to auscultation bilaterally, equal bilateral expansion; chest is symmetrical  Abdomen: Soft, benign, no masses palpated.  No tenderness in any quadrant.  No masses identified.  Extremities: No clubbing cyanosis or edema.  There are good pulses noted throughout both upper and lower extremities.  There is no calf tenderness or Homans sign.  Neuro: Grossly intact without focal deficit.  Her neurologic issues are she is only oriented to person.      Vital Signs and Labs:  Vital Signs Patient Vitals for the past 24 hrs:   BP Temp Temp src Pulse  Resp SpO2   05/20/18 1347 137/65 99.6 °F (37.6 °C) Oral 104 18 98 %   05/20/18 0949 118/69 98.8 °F (37.1 °C) Oral 92 18 97 %   05/20/18 0846 129/63 - - - - -   05/20/18 0535 138/60 98 °F (36.7 °C) Oral - 18 96 %   05/20/18 0134 - 99.7 °F (37.6 °C) Oral - - -   05/19/18 2203 130/56 100.1 °F (37.8 °C) Oral 96 16 97 %   05/19/18 1818 169/78 97.8 °F (36.6 °C) Oral 115 18 95 %       CBC    Results from last 7 days  Lab Units 05/17/18  0550   WBC 10*3/mm3 6.93   HEMOGLOBIN g/dL 9.2*   PLATELETS 10*3/mm3 264     BMP   Results from last 7 days  Lab Units 05/17/18  0550   SODIUM mmol/L 139   POTASSIUM mmol/L 5.3*   CHLORIDE mmol/L 102   CO2 mmol/L 23.3   BUN mg/dL 25*   CREATININE mg/dL 1.07*   GLUCOSE mg/dL 178*     Coag   Results from last 7 days  Lab Units 05/17/18  0832   INR  1.02   APTT seconds 25.7       Active Hospital Problems (** Indicates Principal Problem)    Diagnosis Date Noted   • Intracranial hematoma [S06.369A] 05/17/2018   • Frequent falls [R29.6] 05/17/2018   • Dementia [F03.90] 05/17/2018   • Subdural bleeding [I62.00] 05/16/2018      Resolved Hospital Problems    Diagnosis Date Noted Date Resolved   No resolved problems to display.     [facility]  [id number]    Assessment/Plan     Active Problems:    Subdural bleeding    Intracranial hematoma    Frequent falls    Dementia      78 y.o. female recent subdural hematoma with midline shift which has been improving.  There is also evidence of a left occipital hemorrhage all occurring after a fall while on aspirin and Plavix.  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    I discussed the findings with the patient, her , and daughter.  I have discussed the possibility of vena cava filter placement.  The risk and benefits of filter placement were discussed in detail with them.  At this point it is slightly more risky to go without a filter device than the complication of  vena cava thrombosis with a filter.  At this time the family would rather wait another day and recheck a venous scan.  If there is any further propagation then we would certainly place a vena cava filter.  If remains unchanged will likely continue to watch.  They want to proceed in this manner.  A venous scan will be reordered for tomorrow.    I discussed the patients findings and my recommendations with family.    Abdoulaye Silva Jr., MD  05/20/18  2:29 PM    Please call my office with any question: [phone]

## 2018-05-20 NOTE — PROGRESS NOTES
[facility]  Cardiology Progress Note    Patient Identification:  Name: Angela Carney  Age: 78 y.o.  Sex: female  :  1940  MRN: [id number]                 Follow Up / Chief Complaint: chest pain    Interval History:  78 year old woman with history of dementia. She had a fall in April and CT head at that time was negative. She was discharged to nursing home and had an unwitnessed fall; she was admitted to [facility] where CT head showed left subdural hematoma with moderate midline shift and new left occipital hemorrhage concerning for subacute bleed. She was transferred to [facility] for further neurological workup. During admission, her neurological status has improved. She was found to have BLE acute DVTs but is not a candidate for anticoagulation.  She reported chest pain . Preliminary EKG shows sinus tachycardia.     Subjective: confused.  Having visual hallucinations sees ELIO on the front porch and doesn't understand why he won't come in.  RN states her orientation comes and goes.  She is cooperative during the exam.  She does deny and chest pain or shob.      Objective:    Trop 0.022 <------0.027    Past Medical History:  Past Medical History:   Diagnosis Date   • Dementia    • Diabetes mellitus    • Frequent UTI    • Stroke      Past Surgical History:  History reviewed. No pertinent surgical history.     Social History:   Social History   Substance Use Topics   • Smoking status: Never Smoker   • Smokeless tobacco: Never Used   • Alcohol use Not on file      Family History:  History reviewed. No pertinent family history.       Allergies:  No Known Allergies     Scheduled Meds:    docusate sodium 100 mg Daily   folic acid 1 mg Daily   insulin aspart 0-14 Units 4x Daily With Meals & Nightly   levETIRAcetam 500 mg Q12H   losartan 50 mg Daily   QUEtiapine 25 mg Q12H   thiamine 100 mg Daily       INTAKE AND OUTPUT:    Intake/Output Summary (Last 24 hours) at 18 1156  Last  data filed at 05/20/18 0806   Gross per 24 hour   Intake              720 ml   Output                0 ml   Net              720 ml       Review of Systems:   GI: denies n/v or abd pain  Cardiac: denies cp/pressure or shob  Pulmonary: denies cough    Constitutional:  Temp:  [97.8 °F (36.6 °C)-100.1 °F (37.8 °C)] 98.8 °F (37.1 °C)  Heart Rate:  [] 92  Resp:  [16-18] 18  BP: (118-169)/(56-78) 118/69    Physical Exam:  General:  Appears in no acute distress  Eyes: PERTL,  HEENT:  No JVD. Thyroid not visibly enlarged. No mucosal pallor or cyanosis  Respiratory: Respirations regular and unlabored at rest. BBS with good air entry in all fields. No crackles, rubs or wheezes auscultated  Cardiovascular: S1S2 Regular rate and rhythm. No murmur, rub or gallop. No carotid bruits. DP/PT pulses  2+   . No pretibial pitting edema  Gastrointestinal: Abdomen soft, flat, non tender. Bowel sounds present. No hepatosplenomegaly. No ascites  Musculoskeletal: UDMONT x4. No abnormal movements  Extremities: No digital clubbing or cyanosis  Skin: Color pink. Skin warm and dry to touch. No rashes    Neuro: orient to self,  MAEW.  Psych: pleasant and cooperative      Cardiographics      ECG:             Lab Review     Results from last 7 days  Lab Units 05/19/18  1507 05/19/18  1036   TROPONIN T ng/mL 0.022 0.027           Results from last 7 days  Lab Units 05/17/18  0550   SODIUM mmol/L 139   POTASSIUM mmol/L 5.3*   BUN mg/dL 25*   CREATININE mg/dL 1.07*   CALCIUM mg/dL 9.1         Results from last 7 days  Lab Units 05/17/18  0550   WBC 10*3/mm3 6.93   HEMOGLOBIN g/dL 9.2*   HEMATOCRIT % 28.8*   PLATELETS 10*3/mm3 264       Results from last 7 days  Lab Units 05/17/18  0832   INR  1.02   APTT seconds 25.7      CT of Head 5/17/18  IMPRESSION:  1. The very large acute left subdural has diminished fairly  significantly in size with an associated significant improvement in mass  effect including reduction of midline shift now 3 mm.  2.  "Other findings are stable.         IMPRESSION: CTof head 5/16/18  Stable appearance of multiple areas of hemorrhage including  a broad heterogeneous septated or multicompartmental subdural overlying  the left cerebral hemisphere, a smaller subdural extending inferior to  the left occipital lobe along the tentorium cerebelli, and  intraparenchymal hemorrhage involving the left occipital lobe medially  as well as areas of subarachnoid hemorrhage involving the sylvian  fissure on the left involving the central sulcus superiorly.    Cardiac Testing:  BLE Venous Doppler 5/17/18  Interpretation Summary      · Acute right lower extremity deep vein thrombosis noted in the peroneal.  · Acute left lower extremity deep vein thrombosis noted in the gastrocnemius/soleal.  · All other veins appeared normal bilaterally.        Assessment/Plan:   Chest pain:  No acute ST or T wave changes noted on 12 lead EKG 5/19.  ASA and plavix remain on hold d/t Subdural hematoma (SDH). Neurosurgery is recommending against antiplts until fall risk can be mitigated.  Trop trended down. 0.027 ----->  0.022.  Check lipid panel.  Echo pending to assess RWMA.  Ordered to be done at bedside d/t her confusion.       Subdural Hematoma: improved on CT of head 5/17.  Neurology and neurosurgery  has signed off for inpt.  Neurosurgery recommending f/u in 2-3 weeks.     Dementia: confused, restless.      Bilat DVT: not AC candidate d/t falls and SDH  Possible IVC filter per admitting provider.       Labs/tests: echo, lipid panel today  and  12 lead EKG in am. .       )5/20/2018  DANN Markham/Transcription:   \"Dictated utilizing Dragon dictation\".   "

## 2018-05-21 ENCOUNTER — APPOINTMENT (OUTPATIENT)
Dept: CARDIOLOGY | Facility: HOSPITAL | Age: 78
End: 2018-05-21
Attending: SURGERY

## 2018-05-21 LAB
ALBUMIN SERPL-MCNC: 3.1 G/DL (ref 3.5–5.2)
ALBUMIN/GLOB SERPL: 1.1 G/DL
ALP SERPL-CCNC: 74 U/L (ref 39–117)
ALT SERPL W P-5'-P-CCNC: 28 U/L (ref 1–33)
ANION GAP SERPL CALCULATED.3IONS-SCNC: 10.1 MMOL/L
AST SERPL-CCNC: 18 U/L (ref 1–32)
BH CV LOW VAS LEFT GASTRONEMIUS VESSEL: 1
BH CV LOW VAS LEFT PERONEAL VESSEL: 1
BH CV LOW VAS LEFT POSTERIOR TIBIAL VESSEL: 1
BH CV LOW VAS RIGHT GASTRONEMIUS VESSEL: 1
BH CV LOW VAS RIGHT LESSER SAPH VESSEL: 1
BH CV LOW VAS RIGHT PERONEAL VESSEL: 1
BH CV LOW VAS RIGHT POPLITEAL SPONT: 1
BH CV LOW VAS RIGHT POSTERIOR TIBIAL VESSEL: 1
BH CV LOWER VASCULAR LEFT COMMON FEMORAL AUGMENT: NORMAL
BH CV LOWER VASCULAR LEFT COMMON FEMORAL COMPETENT: NORMAL
BH CV LOWER VASCULAR LEFT COMMON FEMORAL COMPRESS: NORMAL
BH CV LOWER VASCULAR LEFT COMMON FEMORAL PHASIC: NORMAL
BH CV LOWER VASCULAR LEFT COMMON FEMORAL SPONT: NORMAL
BH CV LOWER VASCULAR LEFT DISTAL FEMORAL COMPRESS: NORMAL
BH CV LOWER VASCULAR LEFT GASTRONEMIUS COMPRESS: NORMAL
BH CV LOWER VASCULAR LEFT GASTRONEMIUS THROMBUS: NORMAL
BH CV LOWER VASCULAR LEFT GREATER SAPH AK COMPRESS: NORMAL
BH CV LOWER VASCULAR LEFT GREATER SAPH BK COMPRESS: NORMAL
BH CV LOWER VASCULAR LEFT LESSER SAPH COMPRESS: NORMAL
BH CV LOWER VASCULAR LEFT MID FEMORAL AUGMENT: NORMAL
BH CV LOWER VASCULAR LEFT MID FEMORAL COMPETENT: NORMAL
BH CV LOWER VASCULAR LEFT MID FEMORAL COMPRESS: NORMAL
BH CV LOWER VASCULAR LEFT MID FEMORAL PHASIC: NORMAL
BH CV LOWER VASCULAR LEFT MID FEMORAL SPONT: NORMAL
BH CV LOWER VASCULAR LEFT PERONEAL COMPRESS: NORMAL
BH CV LOWER VASCULAR LEFT PERONEAL THROMBUS: NORMAL
BH CV LOWER VASCULAR LEFT POPLITEAL AUGMENT: NORMAL
BH CV LOWER VASCULAR LEFT POPLITEAL COMPETENT: NORMAL
BH CV LOWER VASCULAR LEFT POPLITEAL COMPRESS: NORMAL
BH CV LOWER VASCULAR LEFT POPLITEAL PHASIC: NORMAL
BH CV LOWER VASCULAR LEFT POPLITEAL SPONT: NORMAL
BH CV LOWER VASCULAR LEFT POSTERIOR TIBIAL COMPRESS: NORMAL
BH CV LOWER VASCULAR LEFT POSTERIOR TIBIAL THROMBUS: NORMAL
BH CV LOWER VASCULAR LEFT PROXIMAL FEMORAL COMPRESS: NORMAL
BH CV LOWER VASCULAR LEFT SAPHENOFEMORAL JUNCTION COMPRESS: NORMAL
BH CV LOWER VASCULAR LEFT SAPHENOFEMORAL JUNCTION PHASIC: NORMAL
BH CV LOWER VASCULAR LEFT SAPHENOFEMORAL JUNCTION SPONT: NORMAL
BH CV LOWER VASCULAR RIGHT COMMON FEMORAL AUGMENT: NORMAL
BH CV LOWER VASCULAR RIGHT COMMON FEMORAL COMPETENT: NORMAL
BH CV LOWER VASCULAR RIGHT COMMON FEMORAL COMPRESS: NORMAL
BH CV LOWER VASCULAR RIGHT COMMON FEMORAL PHASIC: NORMAL
BH CV LOWER VASCULAR RIGHT COMMON FEMORAL SPONT: NORMAL
BH CV LOWER VASCULAR RIGHT DISTAL FEMORAL COMPRESS: NORMAL
BH CV LOWER VASCULAR RIGHT GASTRONEMIUS COMPRESS: NORMAL
BH CV LOWER VASCULAR RIGHT GASTRONEMIUS THROMBUS: NORMAL
BH CV LOWER VASCULAR RIGHT GREATER SAPH AK COMPRESS: NORMAL
BH CV LOWER VASCULAR RIGHT GREATER SAPH BK COMPRESS: NORMAL
BH CV LOWER VASCULAR RIGHT LESSER SAPH COMPRESS: NORMAL
BH CV LOWER VASCULAR RIGHT LESSER SAPH THROMBUS: NORMAL
BH CV LOWER VASCULAR RIGHT MID FEMORAL AUGMENT: NORMAL
BH CV LOWER VASCULAR RIGHT MID FEMORAL COMPETENT: NORMAL
BH CV LOWER VASCULAR RIGHT MID FEMORAL COMPRESS: NORMAL
BH CV LOWER VASCULAR RIGHT MID FEMORAL PHASIC: NORMAL
BH CV LOWER VASCULAR RIGHT MID FEMORAL SPONT: NORMAL
BH CV LOWER VASCULAR RIGHT PERONEAL COMPRESS: NORMAL
BH CV LOWER VASCULAR RIGHT PERONEAL THROMBUS: NORMAL
BH CV LOWER VASCULAR RIGHT POPLITEAL COMPRESS: NORMAL
BH CV LOWER VASCULAR RIGHT POPLITEAL PHASIC: NORMAL
BH CV LOWER VASCULAR RIGHT POPLITEAL SPONT: NORMAL
BH CV LOWER VASCULAR RIGHT POPLITEAL THROMBUS: NORMAL
BH CV LOWER VASCULAR RIGHT POSTERIOR TIBIAL COMPRESS: NORMAL
BH CV LOWER VASCULAR RIGHT POSTERIOR TIBIAL THROMBUS: NORMAL
BH CV LOWER VASCULAR RIGHT PROXIMAL FEMORAL COMPRESS: NORMAL
BH CV LOWER VASCULAR RIGHT SAPHENOFEMORAL JUNCTION COMPRESS: NORMAL
BH CV LOWER VASCULAR RIGHT SAPHENOFEMORAL JUNCTION PHASIC: NORMAL
BH CV LOWER VASCULAR RIGHT SAPHENOFEMORAL JUNCTION SPONT: NORMAL
BILIRUB SERPL-MCNC: 0.3 MG/DL (ref 0.1–1.2)
BUN BLD-MCNC: 18 MG/DL (ref 8–23)
BUN/CREAT SERPL: 18.4 (ref 7–25)
CALCIUM SPEC-SCNC: 9.2 MG/DL (ref 8.6–10.5)
CHLORIDE SERPL-SCNC: 100 MMOL/L (ref 98–107)
CO2 SERPL-SCNC: 26.9 MMOL/L (ref 22–29)
CREAT BLD-MCNC: 0.98 MG/DL (ref 0.57–1)
GFR SERPL CREATININE-BSD FRML MDRD: 55 ML/MIN/1.73
GLOBULIN UR ELPH-MCNC: 2.9 GM/DL
GLUCOSE BLD-MCNC: 83 MG/DL (ref 65–99)
GLUCOSE BLDC GLUCOMTR-MCNC: 144 MG/DL (ref 70–130)
GLUCOSE BLDC GLUCOMTR-MCNC: 193 MG/DL (ref 70–130)
GLUCOSE BLDC GLUCOMTR-MCNC: 233 MG/DL (ref 70–130)
GLUCOSE BLDC GLUCOMTR-MCNC: 265 MG/DL (ref 70–130)
GLUCOSE BLDC GLUCOMTR-MCNC: 270 MG/DL (ref 70–130)
GLUCOSE BLDC GLUCOMTR-MCNC: 324 MG/DL (ref 70–130)
GLUCOSE BLDC GLUCOMTR-MCNC: 402 MG/DL (ref 70–130)
GLUCOSE BLDC GLUCOMTR-MCNC: 498 MG/DL (ref 70–130)
INR PPP: 1.05 (ref 0.9–1.1)
POTASSIUM BLD-SCNC: 3.9 MMOL/L (ref 3.5–5.2)
PROT SERPL-MCNC: 6 G/DL (ref 6–8.5)
PROTHROMBIN TIME: 13.5 SECONDS (ref 11.7–14.2)
SODIUM BLD-SCNC: 137 MMOL/L (ref 136–145)

## 2018-05-21 PROCEDURE — 97110 THERAPEUTIC EXERCISES: CPT | Performed by: OCCUPATIONAL THERAPIST

## 2018-05-21 PROCEDURE — 63710000001 INSULIN ASPART PER 5 UNITS: Performed by: INTERNAL MEDICINE

## 2018-05-21 PROCEDURE — 93970 EXTREMITY STUDY: CPT

## 2018-05-21 PROCEDURE — 92526 ORAL FUNCTION THERAPY: CPT

## 2018-05-21 PROCEDURE — 80053 COMPREHEN METABOLIC PANEL: CPT | Performed by: INTERNAL MEDICINE

## 2018-05-21 PROCEDURE — 82962 GLUCOSE BLOOD TEST: CPT

## 2018-05-21 PROCEDURE — 85610 PROTHROMBIN TIME: CPT | Performed by: INTERNAL MEDICINE

## 2018-05-21 PROCEDURE — 99232 SBSQ HOSP IP/OBS MODERATE 35: CPT | Performed by: INTERNAL MEDICINE

## 2018-05-21 PROCEDURE — 25010000002 LORAZEPAM PER 2 MG: Performed by: INTERNAL MEDICINE

## 2018-05-21 PROCEDURE — 97165 OT EVAL LOW COMPLEX 30 MIN: CPT | Performed by: OCCUPATIONAL THERAPIST

## 2018-05-21 PROCEDURE — 97110 THERAPEUTIC EXERCISES: CPT

## 2018-05-21 RX ADMIN — LEVETIRACETAM 500 MG: 100 SOLUTION ORAL at 08:36

## 2018-05-21 RX ADMIN — FOLIC ACID 1 MG: 1 TABLET ORAL at 08:38

## 2018-05-21 RX ADMIN — LORAZEPAM 1 MG: 2 INJECTION INTRAMUSCULAR; INTRAVENOUS at 04:10

## 2018-05-21 RX ADMIN — INSULIN ASPART 8 UNITS: 100 INJECTION, SOLUTION INTRAVENOUS; SUBCUTANEOUS at 17:15

## 2018-05-21 RX ADMIN — INSULIN ASPART 14 UNITS: 100 INJECTION, SOLUTION INTRAVENOUS; SUBCUTANEOUS at 13:23

## 2018-05-21 RX ADMIN — LOSARTAN POTASSIUM 50 MG: 50 TABLET, FILM COATED ORAL at 08:38

## 2018-05-21 RX ADMIN — INSULIN ASPART 3 UNITS: 100 INJECTION, SOLUTION INTRAVENOUS; SUBCUTANEOUS at 23:40

## 2018-05-21 RX ADMIN — INSULIN ASPART 7 UNITS: 100 INJECTION, SOLUTION INTRAVENOUS; SUBCUTANEOUS at 00:30

## 2018-05-21 RX ADMIN — QUETIAPINE FUMARATE 25 MG: 25 TABLET, FILM COATED ORAL at 23:31

## 2018-05-21 RX ADMIN — DOCUSATE SODIUM 100 MG: 100 CAPSULE, LIQUID FILLED ORAL at 08:37

## 2018-05-21 RX ADMIN — LEVETIRACETAM 500 MG: 100 SOLUTION ORAL at 23:31

## 2018-05-21 RX ADMIN — Medication 100 MG: at 08:37

## 2018-05-21 RX ADMIN — LORAZEPAM 1 MG: 2 INJECTION INTRAMUSCULAR; INTRAVENOUS at 18:35

## 2018-05-21 RX ADMIN — QUETIAPINE FUMARATE 25 MG: 25 TABLET, FILM COATED ORAL at 08:38

## 2018-05-21 NOTE — PROGRESS NOTES
"  Daily Progress Note.   [address]  5/21/2018    Patient:  Name:  Angela Carney  MRN:  [id number]  1940  78 y.o.  female         Interval History:  Follow up traumatic subdural hematoma with history of CVA on Plavix and aspirin.  Mental status improved from yesterday still slightly confused.  aox 2 this am.  Daughter at bedside says patient much more oriented and alert than her baseline      Physical Exam:  /85 (BP Location: Right arm, Patient Position: Lying)   Pulse 102   Temp 98.2 °F (36.8 °C) (Oral)   Resp 18   Ht 162.6 cm (64\")   Wt 72.9 kg (160 lb 11.5 oz)   SpO2 95%   BMI 27.59 kg/m²   Body mass index is 27.59 kg/m².    Intake/Output Summary (Last 24 hours) at 05/21/18 1537  Last data filed at 05/21/18 1341   Gross per 24 hour   Intake              120 ml   Output                0 ml   Net              120 ml     GENERAL: non toxic , mild confused  HEENT:  EOMI, nonicteric sclera, no JVD  PULMONARY:    CTAB, no wheeze, no crackles, no rhonchi, symmetric air entry  CARDIAC:  RRR no MRG, S1 S2  ABD: SNTND BS+  EXT: no c/c/e, pulses symmetric 2+ bilaterally  NEURO:  CNs II-XII intact, movement in all ext, wont follow commands  SKIN: no lesions, no rash  PSYCH: confused calm    Data Review:  Notable Labs:    Results from last 7 days  Lab Units 05/20/18  1652 05/17/18  0550   WBC 10*3/mm3 9.11 6.93   HEMOGLOBIN g/dL 9.7* 9.2*   PLATELETS 10*3/mm3 273 264       Results from last 7 days  Lab Units 05/21/18  0450 05/17/18  0550   SODIUM mmol/L 137 139   POTASSIUM mmol/L 3.9 5.3*   CHLORIDE mmol/L 100 102   CO2 mmol/L 26.9 23.3   BUN mg/dL 18 25*   CREATININE mg/dL 0.98 1.07*   GLUCOSE mg/dL 83 178*   CALCIUM mg/dL 9.2 9.1   Estimated Creatinine Clearance: 47.1 mL/min (by C-G formula based on SCr of 0.98 mg/dL).    Imaging:  dvt reviewed    Scheduled meds:      docusate sodium 100 mg Oral Daily   folic acid 1 mg Oral Daily   insulin aspart 0-14 Units Subcutaneous 4x Daily With " Meals & Nightly   levETIRAcetam 500 mg Oral Q12H   losartan 50 mg Oral Daily   QUEtiapine 25 mg Oral Q12H   thiamine 100 mg Oral Daily       ASSESSMENT  /  PLAN:  Traumatic subdural hematoma  Status post fall  Altered mental status  History of CVA on Plavix and aspirin  BLE DVT  Diabetes mellitus  Elevated LFTs  Hypertension  Dementia     PLAN:    Need follow up duplex in 48-72 hours per vascular surg recs - discussed with CCP if this can be ensured at her SNF.  She becki ltouch base with that facility.  otheriwse will be here for another day.    Ambulate with physical therapy as possible     Cody De Guzman MD  [facility]  05/21/18  2313

## 2018-05-21 NOTE — THERAPY EVALUATION
Acute Care - Occupational Therapy Initial Evaluation   [facility]     Patient Name: Angela Carney  : 1940  MRN: [id number]  Today's Date: 2018  Onset of Illness/Injury or Date of Surgery: 18          Admit Date: 2018       ICD-10-CM ICD-9-CM   1. Subdural bleeding I62.00 432.1   2. Generalized weakness R53.1 780.79     Patient Active Problem List   Diagnosis   • Subdural bleeding   • Intracranial hematoma   • Frequent falls   • Dementia     Past Medical History:   Diagnosis Date   • Dementia    • Diabetes mellitus    • Frequent UTI    • Stroke      History reviewed. No pertinent surgical history.       OT ASSESSMENT FLOWSHEET (last 72 hours)      Occupational Therapy Evaluation     Row Name 18 1207                   OT Evaluation Time/Intention    Subjective Information no complaints  -        Document Type evaluation  -        Mode of Treatment individual therapy;occupational therapy  -KP        Patient Effort adequate  -KP        Symptoms Noted During/After Treatment none  -        Comment pt confused during session. at times didn't seem to be but at other times was confused  -           General Information    Patient Profile Reviewed? yes  -KP        Onset of Illness/Injury or Date of Surgery 18  -KP        General Observations of Patient pt sitting up in chair finishin breakfast  -KP        Prior Level of Function independent:;ADL's;transfer  -KP        Equipment Currently Used at Home none  -KP        Pertinent History of Current Functional Problem SDH bleeding. falls  -KP        Existing Precautions/Restrictions fall;other (see comments)   impulsive  -KP        Benefits Reviewed patient:;improve function;increase independence;increase strength;increase balance  -           Cognitive Assessment/Intervention- PT/OT    Orientation Status (Cognition) person;oriented to  -KP        Follows Commands (Cognition) follows one step commands;25-49% accuracy;verbal  cues/prompting required;repetition of directions required;physical/tactile prompts required  -        Cognitive Function (Cognitive) safety deficit  -        Safety Deficit (Cognitive) moderate deficit  -        Personal Safety Interventions fall prevention program maintained;muscle strengthening facilitated;nonskid shoes/slippers when out of bed  -           Bed Mobility Assessment/Treatment    Comment (Bed Mobility) pt up in chair  -           Functional Mobility    Functional Mobility- Comment pt refused  -           Transfer Assessment/Treatment    Sit-Stand [place] (Transfers) --   pt up in chair . refused to stand .  -           ADL Assessment/Intervention    BADL Assessment/Intervention grooming  -           Grooming Assessment/Training    Comment (Grooming) pt already had shower w. nsg aide assistance.  -           General ROM    GENERAL ROM COMMENTS B UE 8/8  -           General Assessment (Manual Muscle Testing)    Comment, General Manual Muscle Testing (MMT) Assessment pt w. difficulty following directions about 3+/5  -KP           Motor Assessment/Interventions    Additional Documentation Balance (Group);Balance Interventions (Group);Fine Motor Testing & Training (Group);Functional Endurance Training (Group);Therapeutic Exercise Interventions (Group);Therapeutic Exercise (Group)  -           Therapeutic Exercise    Upper Extremity (Therapeutic Exercise) bicep curl, left;bicep curl, right  -        Upper Extremity Range of Motion (Therapeutic Exercise) shoulder flexion/extension, left;shoulder flexion/extension, right  -        Exercise Type (Therapeutic Exercise) AROM (active range of motion);resistive exercises  -        Position (Therapeutic Exercise) seated  -        Sets/Reps (Therapeutic Exercise) 10 reps w. max VC using theraband and w. out theraband. difficulty w. directions  -           Static Sitting Balance    Level of [place] (Unsupported Sitting,  Static Balance) supervision  -           Fine Motor Testing & Training    Comment, Fine Motor Coordination opposition in tact  -KP           Functional Endurance Training    Comment, Functional Endurance fair +  -KP           Positioning and Restraints    Pre-Treatment Position sitting in chair/recliner  -KP        Post Treatment Position chair  -KP        In Chair sitting;call light within reach;encouraged to call for assist;exit alarm on  -KP           Pain Assessment    Additional Documentation Pain Scale: Numbers Pre/Post-Treatment (Group);Pain Scale: FACES Pre/Post-Treatment (Group)  -           Pain Scale: Numbers Pre/Post-Treatment    Pain Scale: Numbers, Pretreatment 0/10 - no pain  -KP        Pain Scale: Numbers, Post-Treatment 0/10 - no pain  -KP           Wound 05/16/18 1230 sacral spine pressure injury    Wound - Properties Group Date first assessed: 05/16/18  -MC Time first assessed: 1230  -MC Present On Admission : yes  -MC Location: sacral spine  -MC Type: pressure injury  - Stage, Pressure Injury: deep tissue injury  -       Wound 05/17/18 0745 Left lateral other (see notes) abrasion    Wound - Properties Group Date first assessed: 05/17/18  -BS Time first assessed: 0745  -BS Present On Admission : yes  -BS Side: Left  -BS Orientation: lateral  -BS Location: other (see notes)  -BS, shoulder  Type: abrasion  -BS       OT Goals    Bathing Goal Selection (OT) bathing, OT goal 1  -        Strength Goal Selection (OT) strength, OT goal 1  -KP        Additional Documentation Strength Goal Selection (OT) (Row)  -           Bathing Goal 1 (OT)    Activity/Assistive Device (Bathing Goal 1, OT) bathing skills, all;grab bar/tub rail;hand-held shower spray hose  -        [place] Level/Cues Needed (Bathing Goal 1, OT) supervision required;set-up required;verbal cues required;contact guard assist  -        Time Frame (Bathing Goal 1, OT) long term goal (LTG);by discharge  -         Progress/Outcomes (Bathing Goal 1, OT) goal ongoing  -KP           Strength Goal 1 (OT)    Strength Goal 1 (OT) to follow direction to complete AROM/ theraband ex 10x2 to inc to 4/5  -KP        Time Frame (Strength Goal 1, OT) long term goal (LTG);by discharge  -KP        Barriers (Strength Goal 1, OT) ability to follow directions  -KP        Progress/Outcome (Strength Goal 1, OT) goal ongoing  -KP          User Key  (r) = Recorded By, (t) = Taken By, (c) = Cosigned By    Initials Name Effective Dates     Gemma Munsoneren Davison, OTR 04/13/15 -     TYE Fernández, RN 06/16/16 -     BS Fatimah Storm, RN 06/16/16 -            Occupational Therapy Education     Title: PT OT SLP Therapies (Active)     Topic: Occupational Therapy (Active)     Point: ADL training (Done)     Description: Instruct learner(s) on proper safety adaptation and remediation techniques during self care or transfers.   Instruct in proper use of assistive devices.   Learning Progress Summary     Learner Status Readiness Method Response Comment Documented by    Patient Done Acceptance E,D NR,VU ed pt on role of OT. POC w. OT. ed on AROM and theraband ex which pt had a lot of difficulty completing as she is confused.  05/21/18 1215          Point: Precautions (Done)     Description: Instruct learner(s) on prescribed precautions during self-care and functional transfers.   Learning Progress Summary     Learner Status Readiness Method Response Comment Documented by    Patient Done Acceptance E,D NR,VU ed pt on role of OT. POC w. OT. ed on AROM and theraband ex which pt had a lot of difficulty completing as she is confused.  05/21/18 1215          Point: Body mechanics (Done)     Description: Instruct learner(s) on proper positioning and spine alignment during self-care, functional mobility activities and/or exercises.   Learning Progress Summary     Learner Status Readiness Method Response Comment Documented by    Patient Done Acceptance  E,D NR, ed pt on role of OT. POC w. OT. ed on AROM and theraband ex which pt had a lot of difficulty completing as she is confused.  05/21/18 1215                      User Key     Initials Effective Dates Name Provider Type Discipline     04/13/15 -  Gemma Davison, OTR Occupational Therapist OT                  OT Recommendation and Plan                Outcome Measures     Row Name 05/21/18 1215 05/20/18 1500 05/19/18 1500       How much help from another person do you currently need...    Turning from your back to your side while in flat bed without using bedrails?  -- 3  -GJ 1  -KH    Moving from lying on back to sitting on the side of a flat bed without bedrails?  -- 3  -GJ 1  -KH    Moving to and from a bed to a chair (including a wheelchair)?  -- 3  -GJ 1  -KH    Standing up from a chair using your arms (e.g., wheelchair, bedside chair)?  -- 3  -GJ 1  -KH    Climbing 3-5 steps with a railing?  -- 3  -GJ 1  -KH    To walk in hospital room?  -- 2  -GJ 1  -KH    AM-PAC 6 Clicks Score  -- 17  - 6  -KH       How much help from another is currently needed...    Putting on and taking off regular lower body clothing? 2  -KP  --  --    Bathing (including washing, rinsing, and drying) 2  -KP  --  --    Toileting (which includes using toilet bed pan or urinal) 2  -KP  --  --    Putting on and taking off regular upper body clothing 3  -KP  --  --    Taking care of personal grooming (such as brushing teeth) 3  -KP  --  --    Eating meals 3  -KP  --  --    Score 15  -KP  --  --       Functional Assessment    Outcome Measure Options AM-PAC 6 Clicks Daily Activity (OT)  - AM-PAC 6 Clicks Basic Mobility (PT)  - AM-PAC 6 Clicks Basic Mobility (PT)  -      User Key  (r) = Recorded By, (t) = Taken By, (c) = Cosigned By    Initials Name Provider Type    NICOL Gómez, PT Physical Therapist    MICH Davison, OTR Occupational Therapist    MALA Dillard, PT Physical Therapist          Time  Calculation:   OT Start Time: 0935  OT Stop Time: 0950  OT Time Calculation (min): 15 min    Therapy Charges for Today     Code Description Service Date Service Provider Modifiers Qty    [id number]  OT THER PROC EA 15 MIN 5/21/2018 Gemma Davison, OTR GO 1    [id number]  OT EVAL LOW COMPLEXITY 2 5/21/2018 CATHY Philippe GO 1               Gemma Davison OTR  5/21/2018

## 2018-05-21 NOTE — THERAPY TREATMENT NOTE
Acute Care - Physical Therapy Treatment Note   [facility]     Patient Name: Angela Carney  : 1940  MRN: [id number]  Today's Date: 2018  Onset of Illness/Injury or Date of Surgery: 18          Admit Date: 2018    Visit Dx:    ICD-10-CM ICD-9-CM   1. Subdural bleeding I62.00 432.1   2. Generalized weakness R53.1 780.79     Patient Active Problem List   Diagnosis   • Subdural bleeding   • Intracranial hematoma   • Frequent falls   • Dementia       Therapy Treatment          Rehabilitation Treatment Summary     Row Name 18 1544 18 1531          Treatment Time/Intention    Discipline physical therapist  -MD physical therapist  -MD     Document Type therapy note (daily note)  -MD therapy note (daily note)  -MD     Subjective Information no complaints  -MD2 no complaints  -MD     Mode of Treatment physical therapy  -MD2 physical therapy  -MD     Patient Effort adequate  -MD2  --     Comment Pt confused this pm.  PT stadnding inroom w RN showing no signs of acute distress.  -MD2 Pt continues to be confused. Pt sitting in chair showing no signs of acute distress.  -MD     Existing Precautions/Restrictions  -- fall  -MD     Recorded by [MD] Elsie Delgado, PT 18 1544  [MD2] Elsie Delgado, PT 18 1547 [MD] Elsie Delgado, PT 18 1547     Row Name 18 1544 18 1531          Cognitive Assessment/Intervention- PT/OT    Orientation Status (Cognition) person;oriented to  -MD person;oriented to  -MD     Follows Commands (Cognition) follows one step commands;25-49% accuracy;verbal cues/prompting required;repetition of directions required;physical/tactile prompts required  -MD follows one step commands;25-49% accuracy;verbal cues/prompting required;repetition of directions required;physical/tactile prompts required  -MD     Cognitive Function (Cognitive) safety deficit  -MD safety deficit  -MD     Safety Deficit (Cognitive) impulsivity  -MD  --     Recorded by [MD] Elsie Delgado, PT  05/21/18 1547 [MD] Elsie Delgado, PT 05/21/18 1547     Row Name 05/21/18 1544             Transfer Assessment/Treatment    Transfer Assessment/Treatment sit-stand transfer;stand-sit transfer  -MD      Sit-Stand [place] (Transfers) verbal cues;contact guard  -MD      Stand-Sit [place] (Transfers) verbal cues;contact guard  -MD      Recorded by [MD] Elsie Delgado, PT 05/21/18 1547      Row Name 05/21/18 1544             Sit-Stand Transfer    Assistive Device (Sit-Stand Transfers) walker, front-wheeled  -MD      Recorded by [MD] Elsie Delgado, PT 05/21/18 1547      Row Name 05/21/18 1544             Stand-Sit Transfer    Assistive Device (Stand-Sit Transfers) walker, front-wheeled  -MD      Recorded by [MD] Elsie Delgado, PT 05/21/18 1547      Row Name 05/21/18 1544             Gait/Stairs Assessment/Training    [place] Level (Gait) verbal cues;contact guard  -MD      Assistive Device (Gait) walker, front-wheeled  -MD      Distance in Feet (Gait) 20  -MD      Deviations/Abnormal Patterns (Gait) base of support, narrow;gait speed decreased;stride length decreased  -MD      Bilateral Gait Deviations forward flexed posture  -MD      Recorded by [MD] Elsie Delgado, PT 05/21/18 1547      Row Name 05/21/18 1544             Positioning and Restraints    Pre-Treatment Position standing in room  -MD      Post Treatment Position chair  -MD      In Chair reclined;sitting;call light within reach;with family/caregiver  -MD      Recorded by [MD] Elsie Delgado, PT 05/21/18 1547      Row Name 05/21/18 1544             Pain Scale: Numbers Pre/Post-Treatment    Pain Scale: Numbers, Pretreatment 0/10 - no pain  -MD      Pain Scale: Numbers, Post-Treatment 0/10 - no pain  -MD      Recorded by [MD] Elsie Delgado, PT 05/21/18 1547      Row Name                Wound 05/16/18 1230 sacral spine pressure injury    Wound - Properties Group Date first assessed: 05/16/18 [MC] Time first assessed: 1230 [MC] Present On Admission : yes [MC] Location: sacral spine  [MC] Type: pressure injury [MC] Stage, Pressure Injury: deep tissue injury [MC] Recorded by:  [MC] Douglas Fernández RN 05/16/18 1847    Row Name                Wound 05/17/18 0745 Left lateral other (see notes) abrasion    Wound - Properties Group Date first assessed: 05/17/18 [BS] Time first assessed: 0745 [BS] Present On Admission : yes [BS] Side: Left [BS] Orientation: lateral [BS] Location: other (see notes) [BS], shoulder  Type: abrasion [BS] Recorded by:  [BS] Fatimah Storm RN 05/17/18 1335      User Key  (r) = Recorded By, (t) = Taken By, (c) = Cosigned By    Initials Name Effective Dates Discipline     Douglas Fernández RN 06/16/16 -  Nurse    MD Elsie Delgado, PT 04/03/18 -  PT    BS Fatimah Storm RN 06/16/16 -  Nurse          Wound 05/16/18 1230 sacral spine pressure injury (Active)   Dressing Appearance open to air 5/21/2018  8:37 AM   Base pink 5/21/2018  8:37 AM   Periwound intact 5/21/2018  8:37 AM   Drainage Amount none 5/21/2018  8:37 AM   Periwound Care, Wound barrier ointment applied 5/20/2018  9:20 PM       Wound 05/17/18 0745 Left lateral other (see notes) abrasion (Active)   Dressing Appearance dry;intact 5/21/2018  8:37 AM   Closure RAMANA 5/21/2018  8:37 AM   Periwound pink 5/20/2018  9:20 PM   Drainage Amount none 5/21/2018  8:37 AM   Dressing Care, Wound low-adherent 5/21/2018  8:37 AM       Wound Left lateral elbow skin tear (Active)   Dressing Appearance dry;intact 5/21/2018  8:37 AM   Closure RAMANA 5/21/2018  8:37 AM   Base pink 5/20/2018  9:20 PM   Periwound ecchymotic 5/20/2018  9:20 PM   Drainage Amount none 5/21/2018  8:37 AM   Dressing Care, Wound low-adherent 5/21/2018  8:37 AM             Physical Therapy Education     Title: PT OT SLP Therapies (Active)     Topic: Physical Therapy (Active)     Point: Mobility training (Done)    Learning Progress Summary     Learner Status Readiness Method Response Comment Documented by    Patient Done Acceptance E NR,MARIO CAMARENA 05/20/18 7401      Active Acceptance E NR,NL   05/19/18 1548     Active Acceptance E,D NR,NL   05/18/18 1139     Done Acceptance E VU,NR,NL   05/17/18 1335     Done Acceptance E,TB,D VU,NR   05/17/18 1311    Family Done Acceptance E NR,VU   05/20/18 1537     Done Acceptance E VU,NR,NL   05/17/18 1335          Point: Home exercise program (Done)    Learning Progress Summary     Learner Status Readiness Method Response Comment Documented by    Patient Done Acceptance E NR,VU   05/20/18 1537     Active Acceptance E,D NR,NL   05/18/18 1139     Done Acceptance E VU,NR,NL   05/17/18 1335     Done Acceptance E,TB,D VU,NR   05/17/18 1311    Family Done Acceptance E NR,VU   05/20/18 1537     Done Acceptance E VU,NR,[facility] 05/17/18 1335          Point: Body mechanics (Done)    Learning Progress Summary     Learner Status Readiness Method Response Comment Documented by    Patient Done Acceptance E NR,VU   05/20/18 1537     Active Acceptance E NR,NL   05/19/18 1548     Active Acceptance E,D NR,[facility] 05/18/18 1139     Done Acceptance E VU,NR,NL   05/17/18 1335     Done Acceptance E,TB,D VU,NR   05/17/18 1311    Family Done Acceptance E NR,VU   05/20/18 1537     Done Acceptance E VU,NR,[facility] 05/17/18 1335          Point: Precautions (Active)    Learning Progress Summary     Learner Status Readiness Method Response Comment Documented by    Patient Active Acceptance E NR  MD 05/21/18 1549     Done Acceptance E NR,VU   05/20/18 1537     Active Acceptance E NR,NL   05/19/18 1548     Active Acceptance E,D NR,[facility] 05/18/18 1139     Done Acceptance E VU,NR,[facility] 05/17/18 1335     Done Acceptance E,TB,D VU,NR   05/17/18 1311    Family Done Acceptance E NR,VU   05/20/18 1537     Done Acceptance E VU,NR,[facility] 05/17/18 1335                      User Key     Initials Effective Dates Name Provider Type Discipline     03/07/18 -  Ricardo Gómez, PT Physical Therapist PT    SL 04/13/15 -  Krystal Pickard, MS CCC-SLP  Speech and Language Pathologist SLP     04/03/18 -  Andressa Musa, PT Physical Therapist PT    MD 04/03/18 -  Elsie Delgado, PT Physical Therapist PT    SV 04/03/18 -  Yola Buckner, PT Physical Therapist PT     06/22/16 -  Debbie Dillard, PT Physical Therapist PT                    PT Recommendation and Plan     Plan of Care Reviewed With: patient  Outcome Summary: Pt progressing w transfer and ambulation in the sense that pt was able perform sit-stand w less assistance today.            Outcome Measures     Row Name 05/21/18 1500 05/21/18 1215 05/20/18 1500       How much help from another person do you currently need...    Turning from your back to your side while in flat bed without using bedrails? 3  -MD  -- 3  -GJ    Moving from lying on back to sitting on the side of a flat bed without bedrails? 3  -MD  -- 3  -GJ    Moving to and from a bed to a chair (including a wheelchair)? 3  -MD  -- 3  -GJ    Standing up from a chair using your arms (e.g., wheelchair, bedside chair)? 3  -MD  -- 3  -GJ    Climbing 3-5 steps with a railing? 3  -MD  -- 3  -GJ    To walk in hospital room? 3  -MD  -- 2  -GJ    AM-PAC 6 Clicks Score 18  -MD  -- 17  -GJ       How much help from another is currently needed...    Putting on and taking off regular lower body clothing?  -- 2  -KP  --    Bathing (including washing, rinsing, and drying)  -- 2  -KP  --    Toileting (which includes using toilet bed pan or urinal)  -- 2  -KP  --    Putting on and taking off regular upper body clothing  -- 3  -KP  --    Taking care of personal grooming (such as brushing teeth)  -- 3  -KP  --    Eating meals  -- 3  -KP  --    Score  -- 15  -KP  --       Functional Assessment    Outcome Measure Options AM-PAC 6 Clicks Basic Mobility (PT)  -MD AM-PAC 6 Clicks Daily Activity (OT)  -KP AM-PAC 6 Clicks Basic Mobility (PT)  -GJ    Row Name 05/19/18 1500             How much help from another person do you currently need...    Turning from your back to your  side while in flat bed without using bedrails? 1  -KH      Moving from lying on back to sitting on the side of a flat bed without bedrails? 1  -KH      Moving to and from a bed to a chair (including a wheelchair)? 1  -KH      Standing up from a chair using your arms (e.g., wheelchair, bedside chair)? 1  -KH      Climbing 3-5 steps with a railing? 1  -KH      To walk in hospital room? 1  -KH      AM-PAC 6 Clicks Score 6  -KH         Functional Assessment    Outcome Measure Options AM-PAC 6 Clicks Basic Mobility (PT)  -KH        User Key  (r) = Recorded By, (t) = Taken By, (c) = Cosigned By    Initials Name Provider Type    NICOL Gómez, PT Physical Therapist    MICH Davison, OTR Occupational Therapist    MD Elsie Delgado, PT Physical Therapist    MALA Dillard, PT Physical Therapist           Time Calculation:         PT Charges     Row Name 05/21/18 1531 05/21/18 1109          Time Calculation    Start Time 1529  -MD  --     Stop Time 1544  -MD  --     Time Calculation (min) 15 min  -MD  --     PT Received On 05/21/18  -MD  --     PT - Next Appointment 05/22/18  -MD 05/21/18  -MD       User Key  (r) = Recorded By, (t) = Taken By, (c) = Cosigned By    Initials Name Provider Type    MD Elsie Delgado, PT Physical Therapist          Therapy Charges for Today     Code Description Service Date Service Provider Modifiers Qty    07670078438 HC PT THER PROC EA 15 MIN 5/21/2018 Elsie Delgado, PT GP 1    34274354752 HC PT THER SUPP EA 15 MIN 5/21/2018 Elsie Delgado, PT GP 1          PT G-Codes  Outcome Measure Options: AM-PAC 6 Clicks Basic Mobility (PT)    Elsie Delgado, PT  5/21/2018

## 2018-05-21 NOTE — PROGRESS NOTES
Continued Stay Note   [facility]     Patient Name: Angela Carney  MRN: [id number]  Today's Date: 5/21/2018    Admit Date: 5/16/2018          Discharge Plan     Row Name 05/21/18 1349       Plan    Plan [facility]    Patient/Family in Agreement with Plan yes    Plan Comments Spoke with Ismael Cornejo/[facility] and she continues to follow for [facility].  Patient qualifies for floor ro SNF direct admit so she will not pre-cert.  Ismael asked that CCP notify her on the morning of anticipated dc.  Transfer packet in CCP office. Bianca Jones RN              Discharge Codes    No documentation.           Bianca Jones RN

## 2018-05-21 NOTE — PROGRESS NOTES
Waiting on repeat venous scan today.  If there is any further propagation of thrombus she will likely need an inferior vena cava filter.    This was discussed with the patient's family yesterday evening.  They agree with the plan.

## 2018-05-21 NOTE — PROGRESS NOTES
BLEV doppler completed. Prelim is positive for acute rt DVT and inessa CVT called to [facility].

## 2018-05-21 NOTE — PLAN OF CARE
Problem: Patient Care Overview  Goal: Plan of Care Review   05/21/18 1217   Coping/Psychosocial   Plan of Care Reviewed With patient   Plan of Care Review   Progress no change   OTHER   Outcome Summary pt evaluated for OT. pt had difficulty following directions and attending. pt also confused. pt has good AROM w. decr strength. pt cont to benefit from therapy to inc independence level

## 2018-05-21 NOTE — PROGRESS NOTES
Venous scan has not been performed yet, still pending.  Further recommendations based on that report.          2:33 pm   Pt has had her repeat scan.  No change in the thromboses-involves the calf veins bilaterally and lower popliteal vein on the right.      Plans are for her to go to skilled nursing facility.  OK to go but needs a repeat venous scan in 48-72 hours. If any propagation of thrombus is present then she would need an IVC filter.

## 2018-05-21 NOTE — PLAN OF CARE
Problem: Patient Care Overview  Goal: Plan of Care Review   05/21/18 1152   Coping/Psychosocial   Plan of Care Reviewed With patient   OTHER   Outcome Summary Swallow re-eval completed. OK to advance to regular foods. Cont NTL.

## 2018-05-21 NOTE — PROGRESS NOTES
[facility]  Cardiology Progress Note    Patient Identification:  Name: Angela Carney  Age: 78 y.o.  Sex: female  :  1940  MRN: [id number]                 Follow Up / Chief Complaint: chest pain    Interval History:  78 year old woman with history of dementia.  had an unwitnessed fall; she was admitted to [facility] where CT head showed left subdural hematoma with moderate midline shift and new left occipital hemorrhage concerning for subacute bleed. She was transferred to [facility] for further neurological workup. During admission, her neurological status has improved. She was found to have BLE acute DVTs but is not a candidate for anticoagulation.  She reported chest pain .      Subjective:   Awake, talkative but confused. Denies pain - appears in no distress    Objective:  HR 70's-100  's-160 / 70's-80's  Afeb  Sats >93% ion room air    Lower extremity ultrasound repeated today with decision regarding IVC filter placement pending venous ultrasound findings    Past Medical History:  Past Medical History:   Diagnosis Date   • Dementia    • Diabetes mellitus    • Frequent UTI    • Stroke      Past Surgical History:  History reviewed. No pertinent surgical history.     Social History:   Social History   Substance Use Topics   • Smoking status: Never Smoker   • Smokeless tobacco: Never Used   • Alcohol use Not on file      Family History:  History reviewed. No pertinent family history.       Allergies:  No Known Allergies     Scheduled Meds:    docusate sodium 100 mg Daily   folic acid 1 mg Daily   insulin aspart 0-14 Units 4x Daily With Meals & Nightly   levETIRAcetam 500 mg Q12H   losartan 50 mg Daily   QUEtiapine 25 mg Q12H   thiamine 100 mg Daily       INTAKE AND OUTPUT:    Intake/Output Summary (Last 24 hours) at 18 1503  Last data filed at 18 1341   Gross per 24 hour   Intake              120 ml   Output                0 ml   Net              120 ml        Review of Systems:   GI: No nausea or vomiting  Cardiac:  HR controllled  Pulmonary: No increased work of breathing      Constitutional:  Temp:  [97.9 °F (36.6 °C)-98.7 °F (37.1 °C)] 98.2 °F (36.8 °C)  Heart Rate:  [] 102  Resp:  [18] 18  BP: (126-166)/(71-85) 166/85    Physical Exam:  General:  Awake, alert resting quietly Appears in no acute distress  Eyes: PERTL, area of old ecchymosis OS  HEENT:  No JVD lying supine. Thyroid not visibly enlarged. Oral mucosal dry, no cyanosis  Respiratory: Respirations regular and unlabored at rest on room air. BBS with good air entry in all fields. No crackles or wheezes auscultated  Cardiovascular: S1S2 Regular rate and rhythm. No murmur, No pretibial pitting edema  Gastrointestinal: Abdomen soft, non tender. Bowel sounds present.   Musculoskeletal: DUMONT x4. No abnormal movements  Extremities: No digital clubbing or cyanosis.  Numerous areas of various aged ecchymosis over both upper extremities  Skin: Color pale, pink. Skin warm and dry to touch.  Numerous areas of various aged ecchymosis over UE  Neuro: Oriented to person.  Psych: pleasant and cooperative      Cardiographics  Tele: currently off telemetry    Echo: Pending    BLE Venous Doppler 5/21/18 Interpretation Summary   · Acute right lower extremity deep vein thrombosis noted in the popliteal, posterior tibial, peroneal and gastrocnemius/soleal.  · Acute right lower extremity superficial thrombophlebitis noted in the small saphenous.  · Acute left lower extremity deep vein thrombosis noted in the posterial tibial, peroneal and gastrocnemius/soleal.  · All other veins appeared normal bilaterally.    Lab Review     Results from last 7 days  Lab Units 05/19/18  1507 05/19/18  1036   TROPONIN T ng/mL 0.022 0.027           Results from last 7 days  Lab Units 05/21/18  0450   SODIUM mmol/L 137   POTASSIUM mmol/L 3.9   BUN mg/dL 18   CREATININE mg/dL 0.98   CALCIUM mg/dL 9.2         Results from last 7 days  Lab  "Units 05/20/18  1652 05/17/18  0550   WBC 10*3/mm3 9.11 6.93   HEMOGLOBIN g/dL 9.7* 9.2*   HEMATOCRIT % 30.1* 28.8*   PLATELETS 10*3/mm3 273 264       Results from last 7 days  Lab Units 05/21/18  0450 05/17/18  0832   INR  1.05 1.02   APTT seconds  --  25.7        Assessment/Plan:   - Chest pain  - HTN  - Subdural hematoma  - Acute BLE DVTs  - DM  - Dementia      Plan:  - chest pain  - no recurrence.  Troponin have been in the indeterminate range ×2.  No acute ST or T wave changes noted on 12 lead EKG 5/19.  ASA and plavix remain on hold d/t Subdural hematoma (SDH). Neurosurgery is recommending against antiplts until fall risk can be mitigated.      - HTN - on Cozaar. Hopefully patient will be able to tolerate ECHO study    - BLE DVT -  not AC candidate d/t falls and SDH  Possible IVC filter per admitting provider.       Labs/tests: echo, lipid panel today  and  12 lead EKG in am. .       )5/21/2018  DANN Duran      EMR Chandnion/Transcription:   \"Dictated utilizing Dragon dictation\".   "

## 2018-05-21 NOTE — SIGNIFICANT NOTE
05/21/18 1109   Rehab Treatment   Discipline physical therapist   Reason Treatment Not Performed patient/family declined treatment  (Pt going for venous doppler to determine if R DVT has extended and if pt is appropriate for IVC.  PT will follow up pending decision on IVC filter.)   Recommendation   PT - Next Appointment 05/21/18

## 2018-05-21 NOTE — PLAN OF CARE
Problem: Patient Care Overview  Goal: Plan of Care Review   05/21/18 1547   Coping/Psychosocial   Plan of Care Reviewed With patient   OTHER   Outcome Summary Pt progressing w transfer and ambulation in the sense that pt was able perform sit-stand w less assistance today.

## 2018-05-21 NOTE — THERAPY RE-EVALUATION
Acute Care - Speech Language Pathology   Swallow Re-Evaluation  [facility]     Patient Name: Angela Carney  : 1940  MRN: [id number]  Today's Date: 2018  Onset of Illness/Injury or Date of Surgery: 18            Admit Date: 2018    Visit Dx:     ICD-10-CM ICD-9-CM   1. Subdural bleeding I62.00 432.1   2. Generalized weakness R53.1 780.79     Patient Active Problem List   Diagnosis   • Subdural bleeding   • Intracranial hematoma   • Frequent falls   • Dementia     Past Medical History:   Diagnosis Date   • Dementia    • Diabetes mellitus    • Frequent UTI    • Stroke      History reviewed. No pertinent surgical history.       SWALLOW EVALUATION (last 72 hours)      SLP Adult Swallow Evaluation     Row Name 18 1100                   Rehab Evaluation    Document Type re-evaluation  -CP        Subjective Information no complaints  -CP        Patient Effort good  -CP           General Information    Patient Profile Reviewed yes  -CP        Current Method of Nutrition mechanical soft, no mixed consistencies;nectar/syrup-thick liquids  -CP        Plans/Goals Discussed with patient  -CP        Barriers to Rehab cognitive status  -CP        Patient's Goals for Discharge return to regular diet  -CP           Clinical Swallow Eval    Oral Prep Phase WFL  -CP        Oral Transit WFL  -CP        Oral Residue WFL  -CP        Pharyngeal Phase suspected pharyngeal impairment  -CP        Esophageal Phase unremarkable  -CP        Clinical Swallow Evaluation Summary Slight wet voicing noted with thin liquids. No s/s with NTL or regular solids. Functional mastication. OK to advance to regular, but cont NTL.  -CP           Pharyngeal Phase Concerns    Pharyngeal Phase Concerns wet vocal quality  -CP        Wet Vocal Quality thin  -CP           Clinical Impression    SLP Swallowing Diagnosis suspected pharyngeal dysfunction  -CP        Functional Impact risk of aspiration/pneumonia  -CP        Rehab  Potential/Prognosis, Swallowing good, to achieve stated therapy goals  -CP        Criteria for Skilled Therapeutic Interventions Met demonstrates skilled criteria  -CP           Recommendations    Therapy Frequency (Swallow) PRN  -CP        Predicted Duration Therapy Intervention (Days) until discharge  -CP        SLP Diet Recommendation regular textures;nectar thick liquids  -CP        Recommended Diagnostics VFSS (MBS)  -CP        Recommended Precautions and Strategies upright posture during/after eating;small bites of food and sips of liquid;no straw  -CP        SLP Rec. for Method of Medication Administration meds crushed;with pudding or applesauce  -CP        Monitor for Signs of Aspiration yes;notify SLP if any concerns;throat clearing;right lower lobe infiltrates  -CP        Anticipated Dischage Disposition skilled nursing facility  -CP          User Key  (r) = Recorded By, (t) = Taken By, (c) = Cosigned By    Initials Name Effective Dates    CP Cassandra Bonds MS CCC-SLP 04/13/15 -         EDUCATION  The patient has been educated in the following areas:   Dysphagia (Swallowing Impairment) Oral Care/Hydration Modified Diet Instruction.    SLP Recommendation and Plan  SLP Swallowing Diagnosis: suspected pharyngeal dysfunction  SLP Diet Recommendation: regular textures, nectar thick liquids  Recommended Precautions and Strategies: upright posture during/after eating, small bites of food and sips of liquid, no straw     Monitor for Signs of Aspiration: yes, notify SLP if any concerns, throat clearing, right lower lobe infiltrates  Recommended Diagnostics: VFSS (MBS)  Criteria for Skilled Therapeutic Interventions Met: demonstrates skilled criteria  Anticipated Dischage Disposition: skilled nursing facility  Rehab Potential/Prognosis, Swallowing: good, to achieve stated therapy goals  Therapy Frequency (Swallow): PRN  Predicted Duration Therapy Intervention (Days): until discharge       Plan of Care Reviewed  With: patient  Plan of Care Review  Plan of Care Reviewed With: patient  Outcome Summary: Swallow re-eval completed. OK to advance to regular foods. Cont NTL.            SLP Outcome Measures (last 72 hours)      SLP Outcome Measures     Row Name 05/21/18 1100             SLP Outcome Measures    Outcome Measure Used? Adult NOMS  -CP         FCM Scores    Swallowing FCM Score 5  -CP        User Key  (r) = Recorded By, (t) = Taken By, (c) = Cosigned By    Initials Name Effective Dates    JON Bonds MS CCC-SLP 04/13/15 -            Time Calculation:         Time Calculation- SLP     Row Name 05/21/18 1155             Time Calculation- SLP    SLP Start Time 0945  -CP      SLP Stop Time 1045  -CP      SLP Time Calculation (min) 60 min  -CP      SLP Received On 05/21/18  -CP        User Key  (r) = Recorded By, (t) = Taken By, (c) = Cosigned By    Initials Name Provider Type    JON Bonds MS CCC-SLP Speech and Language Pathologist          Therapy Charges for Today     Code Description Service Date Service Provider Modifiers Qty    [id number]  ST TREATMENT SWALLOW 4 5/21/2018 Cassandra Bonds MS CCC-SLP GN 1               Cassandra Bonds MS CCC-SONALI  5/21/2018

## 2018-05-21 NOTE — PROGRESS NOTES
Continued Stay Note   [facility]     Patient Name: Angela Carney  MRN: [id number]  Today's Date: 5/21/2018    Admit Date: 5/16/2018          Discharge Plan     Row Name 05/21/18 1643       Plan    Plan [facility]    Patient/Family in Agreement with Plan yes    Plan Comments Spoke with Dr Cody De Guzman and patient will need repeat venous doppler in 48-72 hours.  He is wanting to know if this can be done at rehab or if patient will need to stay here.  Spoke with Ismael Cornejo/[facility] and they can send patient out for venous dopller as long as they have an order and have the information about who to fax the results to.  Per Ismael, facility can accept tomorrow.  Dr De Guzman updated. Bianca Jones RN    Row Name 05/21/18 3171       Plan    Plan [facility]    Patient/Family in Agreement with Plan yes    Plan Comments Spoke with Ismael Cornejo/[facility] and she continues to follow for [facility].  Patient qualifies for floor ro SNF direct admit so she will not pre-cert.  Ismael asked that [facility] notify her on the morning of anticipated dc.  Transfer packet in CCP office. Bianca Jones RN              Discharge Codes    No documentation.       Expected Discharge Date and Time     Expected Discharge Date Expected Discharge Time    May 22, 2018             Bianca Jones RN

## 2018-05-21 NOTE — NURSING NOTE
This nurse spoke with Dr. De Guzman about 1100 blood glucose of 498 instructed to only give scheduled dose of 14 units Novolog. No new orders given. Will continue to monitor.

## 2018-05-22 ENCOUNTER — APPOINTMENT (OUTPATIENT)
Dept: CARDIOLOGY | Facility: HOSPITAL | Age: 78
End: 2018-05-22

## 2018-05-22 LAB
ALBUMIN SERPL-MCNC: 3 G/DL (ref 3.5–5.2)
ALBUMIN/GLOB SERPL: 1 G/DL
ALP SERPL-CCNC: 78 U/L (ref 39–117)
ALT SERPL W P-5'-P-CCNC: 25 U/L (ref 1–33)
ANION GAP SERPL CALCULATED.3IONS-SCNC: 11.3 MMOL/L
AST SERPL-CCNC: 14 U/L (ref 1–32)
BILIRUB SERPL-MCNC: 0.4 MG/DL (ref 0.1–1.2)
BUN BLD-MCNC: 21 MG/DL (ref 8–23)
BUN/CREAT SERPL: 21.2 (ref 7–25)
CALCIUM SPEC-SCNC: 9.1 MG/DL (ref 8.6–10.5)
CHLORIDE SERPL-SCNC: 98 MMOL/L (ref 98–107)
CO2 SERPL-SCNC: 26.7 MMOL/L (ref 22–29)
CREAT BLD-MCNC: 0.99 MG/DL (ref 0.57–1)
GFR SERPL CREATININE-BSD FRML MDRD: 54 ML/MIN/1.73
GLOBULIN UR ELPH-MCNC: 3.1 GM/DL
GLUCOSE BLD-MCNC: 197 MG/DL (ref 65–99)
GLUCOSE BLDC GLUCOMTR-MCNC: 224 MG/DL (ref 70–130)
GLUCOSE BLDC GLUCOMTR-MCNC: 225 MG/DL (ref 70–130)
GLUCOSE BLDC GLUCOMTR-MCNC: 228 MG/DL (ref 70–130)
GLUCOSE BLDC GLUCOMTR-MCNC: 259 MG/DL (ref 70–130)
POTASSIUM BLD-SCNC: 4.4 MMOL/L (ref 3.5–5.2)
PROT SERPL-MCNC: 6.1 G/DL (ref 6–8.5)
SODIUM BLD-SCNC: 136 MMOL/L (ref 136–145)

## 2018-05-22 PROCEDURE — 93306 TTE W/DOPPLER COMPLETE: CPT | Performed by: INTERNAL MEDICINE

## 2018-05-22 PROCEDURE — 93306 TTE W/DOPPLER COMPLETE: CPT

## 2018-05-22 PROCEDURE — 80053 COMPREHEN METABOLIC PANEL: CPT | Performed by: INTERNAL MEDICINE

## 2018-05-22 PROCEDURE — 82962 GLUCOSE BLOOD TEST: CPT

## 2018-05-22 PROCEDURE — 63710000001 INSULIN ASPART PER 5 UNITS: Performed by: INTERNAL MEDICINE

## 2018-05-22 PROCEDURE — 25010000002 LORAZEPAM PER 2 MG: Performed by: INTERNAL MEDICINE

## 2018-05-22 PROCEDURE — 99231 SBSQ HOSP IP/OBS SF/LOW 25: CPT | Performed by: NURSE PRACTITIONER

## 2018-05-22 PROCEDURE — 97110 THERAPEUTIC EXERCISES: CPT

## 2018-05-22 RX ORDER — ATORVASTATIN CALCIUM 10 MG/1
10 TABLET, FILM COATED ORAL NIGHTLY
Status: DISCONTINUED | OUTPATIENT
Start: 2018-05-22 | End: 2018-05-23 | Stop reason: HOSPADM

## 2018-05-22 RX ADMIN — INSULIN ASPART 5 UNITS: 100 INJECTION, SOLUTION INTRAVENOUS; SUBCUTANEOUS at 12:12

## 2018-05-22 RX ADMIN — Medication 100 MG: at 08:51

## 2018-05-22 RX ADMIN — LOSARTAN POTASSIUM 50 MG: 50 TABLET, FILM COATED ORAL at 08:52

## 2018-05-22 RX ADMIN — INSULIN ASPART 5 UNITS: 100 INJECTION, SOLUTION INTRAVENOUS; SUBCUTANEOUS at 08:51

## 2018-05-22 RX ADMIN — INSULIN ASPART 8 UNITS: 100 INJECTION, SOLUTION INTRAVENOUS; SUBCUTANEOUS at 17:59

## 2018-05-22 RX ADMIN — DOCUSATE SODIUM 100 MG: 100 CAPSULE, LIQUID FILLED ORAL at 08:51

## 2018-05-22 RX ADMIN — QUETIAPINE FUMARATE 25 MG: 25 TABLET, FILM COATED ORAL at 08:51

## 2018-05-22 RX ADMIN — LORAZEPAM 1 MG: 2 INJECTION INTRAMUSCULAR; INTRAVENOUS at 18:46

## 2018-05-22 RX ADMIN — FOLIC ACID 1 MG: 1 TABLET ORAL at 08:51

## 2018-05-22 RX ADMIN — LEVETIRACETAM 500 MG: 100 SOLUTION ORAL at 08:52

## 2018-05-22 RX ADMIN — LORAZEPAM 1 MG: 2 INJECTION INTRAMUSCULAR; INTRAVENOUS at 22:53

## 2018-05-22 NOTE — PLAN OF CARE
Problem: Fall Risk (Adult)  Goal: Identify Related Risk Factors and Signs and Symptoms  Outcome: Ongoing (interventions implemented as appropriate)   05/22/18 0638   Fall Risk (Adult)   Related Risk Factors (Fall Risk) age-related changes;confusion/agitation;gait/mobility problems;neuro disease/injury;polypharmacy;slippery/uneven surfaces;environment unfamiliar   Signs and Symptoms (Fall Risk) presence of risk factors     Goal: Absence of Fall  Outcome: Ongoing (interventions implemented as appropriate)   05/22/18 0638   Fall Risk (Adult)   Absence of Fall making progress toward outcome       Problem: Patient Care Overview  Goal: Plan of Care Review  Outcome: Ongoing (interventions implemented as appropriate)   05/21/18 2330 05/22/18 0638   Coping/Psychosocial   Plan of Care Reviewed With patient --    OTHER   Outcome Summary --  Remains A&Ox1. Reduced LOC, remained asleep for majority of shift, awaking to voice. Uncooperative re care. Incontinent x2.

## 2018-05-22 NOTE — PROGRESS NOTES
[facility]    Physicians Statement of Medical Necessity for Ambulance Transportation    It is medically necessary for:    Patient Name: Angela Carney    Insurance Information:      To be transported by ambulance:    From (if nursing facility, specify level of care: skilled, FPC, etc): [facility]    To (specify level of care if nursing facility): [facility] in [place], [place]    Date of Service: 5/22/18    For dialysis patients state date dialysis began: n/a    Diagnosis: SDH    Past Medical/Surgical History:  Past Medical History:   Diagnosis Date   • Dementia    • Diabetes mellitus    • Frequent UTI    • Stroke       History reviewed. No pertinent surgical history.     Current Objective Medical Evidence(including physical exam finding to support reason for limitations):    Immobilization syndrome    Other: confusion/ impulsivity/ assist needed    Physician Signature:           (RN,NP,PA,CAN, Discharge Planner) Date/Time: 5/23/18     Printed Name:    ____Alissa Gandhi RN/CCP_________________________    [facility]   Phone: [phone] Phone: [phone] Phone: [phone]   Fax: [phone] Fax: [phone] Fax: [phone]

## 2018-05-22 NOTE — PLAN OF CARE
Problem: Fall Risk (Adult)  Goal: Absence of Fall  Outcome: Ongoing (interventions implemented as appropriate)      Problem: Skin Injury Risk (Adult)  Goal: Identify Related Risk Factors and Signs and Symptoms  Outcome: Ongoing (interventions implemented as appropriate)      Problem: Patient Care Overview  Goal: Plan of Care Review  Outcome: Ongoing (interventions implemented as appropriate)   05/22/18 5910   Coping/Psychosocial   Plan of Care Reviewed With patient   Plan of Care Review   Progress no change   OTHER   Outcome Summary Pt alert and oriented x1. denies pain. up with two assist. incontinent. echo today and dopplers in am, possible dc tomorrow

## 2018-05-22 NOTE — THERAPY TREATMENT NOTE
Acute Care - Physical Therapy Treatment Note   [facility]     Patient Name: Angela Carney  : 1940  MRN: [id number]  Today's Date: 2018  Onset of Illness/Injury or Date of Surgery: 18          Admit Date: 2018    Visit Dx:    ICD-10-CM ICD-9-CM   1. Subdural bleeding I62.00 432.1   2. Generalized weakness R53.1 780.79     Patient Active Problem List   Diagnosis   • Subdural bleeding   • Intracranial hematoma   • Frequent falls   • Dementia       Therapy Treatment          Rehabilitation Treatment Summary     Row Name 18 1644             Treatment Time/Intention    Discipline physical therapist  -PC      Document Type therapy note (daily note)  -PC      Subjective Information no complaints  -PC      Mode of Treatment physical therapy  -PC      Patient/Family Observations pt is in bed, no acute distress, pleasantly confused  -PC      Therapy Frequency (PT Clinical Impression) daily  -PC2      Patient Effort adequate  -PC2      Comment pleasantly confused, needs continued verbal cues and redirection  -PC2      Existing Precautions/Restrictions fall  -PC2      Recorded by [PC] Christine Beltran, PT 18 164  [PC2] Christine Beltran, PT 18 171      Row Name 18 1648             Cognitive Assessment/Intervention- PT/OT    Orientation Status (Cognition) oriented to;person  -PC      Follows Commands (Cognition) follows one step commands;75-90% accuracy;repetition of directions required  -PC      Cognitive Function (Cognitive) safety deficit  -PC      Safety Deficit (Cognitive) impulsivity  -PC      Personal Safety Interventions fall prevention program maintained;gait belt  -PC      Recorded by [PC] Christine Bletran, PT 18 1717      Row Name 18 1645             Safety Issues, Functional Mobility    Safety Issues Affecting Function (Mobility) impulsivity;judgment;insight into deficits/self awareness;safety precautions follow-through/compliance  -PC      Recorded by [PC]  Christine Beltran, PT 05/22/18 1719      Row Name 05/22/18 1644             Bed Mobility Assessment/Treatment    Supine-Sit [place] (Bed Mobility) contact guard  -PC      Sit-Supine [place] (Bed Mobility) contact guard  -PC      Recorded by [PC] Christine Beltran, PT 05/22/18 1719      Row Name 05/22/18 1644             Transfer Assessment/Treatment    Sit-Stand [place] (Transfers) contact guard  -PC      Stand-Sit [place] (Transfers) contact guard  -PC      Recorded by [PC] Christine Beltran, PT 05/22/18 1719      Row Name 05/22/18 1644             Gait/Stairs Assessment/Training    [place] Level (Gait) contact guard  -PC      Assistive Device (Gait) walker, front-wheeled  -PC      Distance in Feet (Gait) 120 ft  -PC      Pattern (Gait) step-through  -PC      Deviations/Abnormal Patterns (Gait) ataxic;soumya decreased;stride length decreased;gait speed decreased  -PC      Comment (Gait/Stairs) continual verbal cues and redirection required  -PC      Recorded by [PC] Christine Beltran, PT 05/22/18 1719      Row Name 05/22/18 1644             Positioning and Restraints    Pre-Treatment Position in bed  -PC      Post Treatment Position bed  -PC      In Bed supine;call light within reach;encouraged to call for assist;exit alarm on  -PC      Recorded by [PC] Christine Beltran, PT 05/22/18 1719      Row Name 05/22/18 1644             Pain Scale: Numbers Pre/Post-Treatment    Pain Scale: Numbers, Pretreatment 0/10 - no pain  -PC      Recorded by [PC] Christine Beltran, PT 05/22/18 1719      Row Name                Wound 05/16/18 1230 sacral spine pressure injury    Wound - Properties Group Date first assessed: 05/16/18 [MC] Time first assessed: 1230 [MC] Present On Admission : yes [MC] Location: sacral spine [MC] Type: pressure injury [MC] Stage, Pressure Injury: deep tissue injury [MC] Recorded by:  [MC] Douglas Fernández RN 05/16/18 2710    Row Name                Wound 05/17/18 0768 Left lateral other (see  notes) abrasion    Wound - Properties Group Date first assessed: 05/17/18 [BS] Time first assessed: 0745 [BS] Present On Admission : yes [BS] Side: Left [BS] Orientation: lateral [BS] Location: other (see notes) [BS], shoulder  Type: abrasion [BS] Recorded by:  [BS] Fatimah Storm RN 05/17/18 1335      User Key  (r) = Recorded By, (t) = Taken By, (c) = Cosigned By    Initials Name Effective Dates Discipline    PC Christine Beltran, PT 04/03/18 -  PT    TYE Fernández RN 06/16/16 -  Nurse    BS Fatimah Storm RN 06/16/16 -  Nurse          Wound 05/16/18 1230 sacral spine pressure injury (Active)   Dressing Appearance open to air 5/22/2018  8:45 AM   Base pink 5/22/2018  8:45 AM   Periwound intact 5/22/2018  8:45 AM   Periwound Temperature warm 5/22/2018  8:45 AM   Periwound Skin Turgor soft 5/22/2018  8:45 AM   Edges irregular 5/22/2018  8:45 AM   Drainage Amount none 5/22/2018  8:45 AM   Dressing Care, Wound open to air 5/22/2018  8:45 AM       Wound 05/17/18 0745 Left lateral other (see notes) abrasion (Active)   Dressing Appearance dry;intact;no drainage 5/22/2018  8:45 AM   Closure RAMANA 5/22/2018  8:45 AM   Base dressing in place, unable to visualize 5/22/2018  8:45 AM   Drainage Amount none 5/22/2018  8:45 AM   Dressing Care, Wound low-adherent 5/22/2018  8:45 AM       Wound Left lateral elbow skin tear (Active)   Dressing Appearance open to air 5/22/2018  8:45 AM   Closure None 5/22/2018  8:45 AM   Drainage Amount none 5/22/2018  8:45 AM   Dressing Care, Wound open to air 5/22/2018  8:45 AM             Physical Therapy Education     Title: PT OT SLP Therapies (Active)     Topic: Physical Therapy (Active)     Point: Mobility training (Active)    Learning Progress Summary     Learner Status Readiness Method Response Comment Documented by    Patient Active Acceptance E,D CHAU  PC 05/22/18 1710     Done Acceptance E NR,MARIO CAMARENA 05/20/18 1533     Active Acceptance E YANIV RITCHIE 05/19/18 1545     Active  Acceptance E,D NR,NL   05/18/18 1139     Done Acceptance E VU,NR,NL   05/17/18 1335     Done Acceptance E,TB,D VU,NR   05/17/18 1311    Family Done Acceptance E NR,VU  GJ 05/20/18 1537     Done Acceptance E VU,NR,NL   05/17/18 1335          Point: Home exercise program (Active)    Learning Progress Summary     Learner Status Readiness Method Response Comment Documented by    Patient Active Acceptance E,D NR   05/22/18 1719     Done Acceptance E NR,VU   05/20/18 1537     Active Acceptance E,D NR,[facility] 05/18/18 1139     Done Acceptance E VU,NR,[facility] 05/17/18 1335     Done Acceptance E,TB,D VU,NR   05/17/18 1311    Family Done Acceptance E NR,VU   05/20/18 1537     Done Acceptance E VU,NR,[facility] 05/17/18 1335          Point: Body mechanics (Active)    Learning Progress Summary     Learner Status Readiness Method Response Comment Documented by    Patient Active Acceptance E,D NR   05/22/18 1719     Done Acceptance E NR,VU   05/20/18 1537     Active Acceptance E NR,[facility] 05/19/18 1548     Active Acceptance E,D NR,[facility] 05/18/18 1139     Done Acceptance E VU,NR,[facility] 05/17/18 1335     Done Acceptance E,TB,D VU,NR   05/17/18 1311    Family Done Acceptance E NR,VU   05/20/18 1537     Done Acceptance E VU,NR,[facility] 05/17/18 1335          Point: Precautions (Active)    Learning Progress Summary     Learner Status Readiness Method Response Comment Documented by    Patient Active Acceptance E,D NR   05/22/18 1719     Active Acceptance E NR  MD 05/21/18 1549     Done Acceptance E NR,VU   05/20/18 1537     Active Acceptance E NR,NL   05/19/18 1548     Active Acceptance E,D NR,[facility] 05/18/18 1139     Done Acceptance E VU,NR,[facility] 05/17/18 1335     Done Acceptance E,TB,D VU,NR   05/17/18 1311    Family Done Acceptance E NR,VU   05/20/18 1537     Done Acceptance E VU,NR,[facility] 05/17/18 1335                      User Key     Initials Effective Dates Name Provider Type Discipline    GJ 03/07/18  -  Ricardo Gómez, PT Physical Therapist PT    SL 04/13/15 -  Krystal Pickard, MS CCC-SLP Speech and Language Pathologist SLP     04/03/18 -  Andressa Musa, PT Physical Therapist PT    PC 04/03/18 -  Christine Beltran, PT Physical Therapist PT    MD 04/03/18 -  Elsie Delgado, PT Physical Therapist PT    SV 04/03/18 -  Yola Buckner, PT Physical Therapist PT     06/22/16 -  Debbie Dillard, PT Physical Therapist PT                    PT Recommendation and Plan  Therapy Frequency (PT Clinical Impression): daily  Plan of Care Reviewed With: patient  Progress: improving  Outcome Summary: pt able to increase ambulation distance and requiring less assist for functional mobility but very confused requiring constant verbal cues and redirection.          Outcome Measures     Row Name 05/22/18 1700 05/21/18 1500 05/21/18 1215       How much help from another person do you currently need...    Turning from your back to your side while in flat bed without using bedrails? 4  - 3  -MD  --    Moving from lying on back to sitting on the side of a flat bed without bedrails? 3  - 3  -MD  --    Moving to and from a bed to a chair (including a wheelchair)? 3  - 3  -MD  --    Standing up from a chair using your arms (e.g., wheelchair, bedside chair)? 3  - 3  -MD  --    Climbing 3-5 steps with a railing? 3  - 3  -MD  --    To walk in hospital room? 3  - 3  -MD  --    AM-PAC 6 Clicks Score 19  -PC 18  -MD  --       How much help from another is currently needed...    Putting on and taking off regular lower body clothing?  --  -- 2  -KP    Bathing (including washing, rinsing, and drying)  --  -- 2  -KP    Toileting (which includes using toilet bed pan or urinal)  --  -- 2  -KP    Putting on and taking off regular upper body clothing  --  -- 3  -KP    Taking care of personal grooming (such as brushing teeth)  --  -- 3  -KP    Eating meals  --  -- 3  -KP    Score  --  -- 15  -KP       Functional Assessment    Outcome Measure Options  --  AM-PAC 6 Clicks Basic Mobility (PT)  -MD AM-PAC 6 Clicks Daily Activity (OT)  -    Row Name 05/20/18 1500             How much help from another person do you currently need...    Turning from your back to your side while in flat bed without using bedrails? 3  -GJ      Moving from lying on back to sitting on the side of a flat bed without bedrails? 3  -GJ      Moving to and from a bed to a chair (including a wheelchair)? 3  -GJ      Standing up from a chair using your arms (e.g., wheelchair, bedside chair)? 3  -GJ      Climbing 3-5 steps with a railing? 3  -GJ      To walk in hospital room? 2  -GJ      AM-PAC 6 Clicks Score 17  -GJ         Functional Assessment    Outcome Measure Options AM-PAC 6 Clicks Basic Mobility (PT)  -GJ        User Key  (r) = Recorded By, (t) = Taken By, (c) = Cosigned By    Initials Name Provider Type     Ricardo Gómez, PT Physical Therapist    KP Gemma Davison, OTR Occupational Therapist    PC Christine Beltran, PT Physical Therapist    MD Elsie Delgado, PT Physical Therapist           Time Calculation:         PT Charges     Row Name 05/22/18 1721             Time Calculation    Start Time 1610  -PC      Stop Time 1623  -PC      Time Calculation (min) 13 min  -PC      PT Received On 05/22/18  -PC      PT - Next Appointment 05/23/18  -PC        User Key  (r) = Recorded By, (t) = Taken By, (c) = Cosigned By    Initials Name Provider Type    PC Christine Beltran, PT Physical Therapist          Therapy Charges for Today     Code Description Service Date Service Provider Modifiers Qty    [id number]  PT THER PROC EA 15 MIN 5/22/2018 Christine Beltran, PT GP 1          PT G-Codes  Outcome Measure Options: AM-[facility] 6 Clicks Basic Mobility (PT)    Christine Beltran, PT  5/22/2018

## 2018-05-22 NOTE — PROGRESS NOTES
"[facility]  Cardiology Progress Note    Patient Identification:  Name: Angela Carney  Age: 78 y.o.  Sex: female  :  1940  MRN: [id number]                 Follow Up / Chief Complaint: chest pain    Interval History:  78 year old woman with history of dementia. Had an unwitnessed fall; she was admitted to [facility] where CT head showed left subdural hematoma with moderate midline shift and new left occipital hemorrhage concerning for subacute bleed. She was transferred to [facility] for further neurological workup. During admission, her neurological status has improved. She was found to have BLE acute DVTs but is not a candidate for anticoagulation.  She reported chest pain .      Subjective:   Unable to accurately assess-patient confused  Denies pain or shortness of breath \"I think I have pneumonia\"    Objective:  HR 80-90's  's / 80's-90's  Afeb  Sats >91% ion room air      Past Medical History:  Past Medical History:   Diagnosis Date   • Dementia    • Diabetes mellitus    • Frequent UTI    • Stroke      Past Surgical History:  History reviewed. No pertinent surgical history.     Social History:   Social History   Substance Use Topics   • Smoking status: Never Smoker   • Smokeless tobacco: Never Used   • Alcohol use Not on file      Family History:  History reviewed. No pertinent family history.       Allergies:  No Known Allergies     Scheduled Meds:    docusate sodium 100 mg Daily   folic acid 1 mg Daily   insulin aspart 0-14 Units 4x Daily With Meals & Nightly   levETIRAcetam 500 mg Q12H   losartan 50 mg Daily   QUEtiapine 25 mg Q12H   thiamine 100 mg Daily       INTAKE AND OUTPUT:  No intake or output data in the 24 hours ending 18 1450    Review of Systems:   GI: No nausea or vomiting  Cardiac: No chest pain  Pulmonary: No increased work of breathing      Constitutional:  Temp:  [98.2 °F (36.8 °C)-98.9 °F (37.2 °C)] 98.2 °F (36.8 °C)  Heart Rate:  [] " 91  Resp:  [16-18] 18  BP: (121-154)/(60-91) 134/91    Physical Exam:  General:  Awake, alert and resting quietly Appears in no acute distress  Eyes: PERTL  HEENT:  No JVD.  No mucosal cyanosis  Respiratory: Respirations regular and unlabored at rest on room air. BBS with good air entry in all fields. No crackles or wheezes auscultated  Cardiovascular: S1S2 Regular rate and rhythm. No murmur, No pretibial pitting   Gastrointestinal: Abdomen soft, non tender. Bowel sounds present.   Musculoskeletal: DUMONT x4. No abnormal movements  Extremities: No digital clubbing or cyanosis.  Numerous areas of various aged ecchymosis over both upper extremities  Skin: Color pink. Skin warm and dry to touch.  Numerous areas of various aged ecchymosis over UE  Neuro: Oriented to person.  Psych: pleasant and cooperative      Cardiographics  Tele: off telemetry    Echo: Pending    BLE Venous Doppler 5/21/18 Interpretation Summary   · Acute right lower extremity deep vein thrombosis noted in the popliteal, posterior tibial, peroneal and gastrocnemius/soleal.  · Acute right lower extremity superficial thrombophlebitis noted in the small saphenous.  · Acute left lower extremity deep vein thrombosis noted in the posterial tibial, peroneal and gastrocnemius/soleal.  · All other veins appeared normal bilaterally.    Lab Review     Results from last 7 days  Lab Units 05/19/18  1507 05/19/18  1036   TROPONIN T ng/mL 0.022 0.027     Results from last 7 days  Lab Units 05/22/18  0634   SODIUM mmol/L 136   POTASSIUM mmol/L 4.4   BUN mg/dL 21   CREATININE mg/dL 0.99   CALCIUM mg/dL 9.1     Results from last 7 days  Lab Units 05/20/18  1652 05/17/18  0550   WBC 10*3/mm3 9.11 6.93   HEMOGLOBIN g/dL 9.7* 9.2*   HEMATOCRIT % 30.1* 28.8*   PLATELETS 10*3/mm3 273 264      5/19/2018 15:07   Total Cholesterol 185   HDL Cholesterol 40   LDL Cholesterol  123 (H)   VLDL Cholesterol 21.8   Triglycerides 109   LDL/HDL Ratio 3.08       Results from last 7  "days  Lab Units 05/21/18  0450 05/17/18  0832   INR  1.05 1.02   APTT seconds  --  25.7        Assessment/Plan:   - Chest pain  - HTN  - Subdural hematoma  - Acute BLE DVTs  - DM  - Dyslipidemia  - Dementia      Plan:  - chest pain  - no recurrence.  Troponin have been in the indeterminate range ×2.  No acute ST or T wave changes noted on 12 lead EKG 5/19.  ASA and plavix remain on hold follwing SDH. Neurosurgery is recommending against antiplts until fall risk can be mitigated.      - HTN - on Cozaar.     - Dyslipidemia - LDL not at goal.  Start statin.  Repeat LFTs and lipid panel recommended in 8-12 weeks    - BLE DVT -  not AC candidate due to falls and SDH  Possible IVC filter pending follow-up venous ultrasound    Start statin.  Repeat LFTs and lipid panel recommended in 8-12 weeks.      Labs/tests ordered:  medication adjustment    )5/22/2018  DANN Duranon/Transcription:   \"Dictated utilizing Dragon dictation\".   "

## 2018-05-23 ENCOUNTER — APPOINTMENT (OUTPATIENT)
Dept: CARDIOLOGY | Facility: HOSPITAL | Age: 78
End: 2018-05-23
Attending: INTERNAL MEDICINE

## 2018-05-23 VITALS
HEIGHT: 64 IN | TEMPERATURE: 98.2 F | OXYGEN SATURATION: 97 % | BODY MASS INDEX: 27.31 KG/M2 | WEIGHT: 160 LBS | HEART RATE: 101 BPM | DIASTOLIC BLOOD PRESSURE: 94 MMHG | SYSTOLIC BLOOD PRESSURE: 125 MMHG | RESPIRATION RATE: 18 BRPM

## 2018-05-23 LAB
ALBUMIN SERPL-MCNC: 2.9 G/DL (ref 3.5–5.2)
ALBUMIN/GLOB SERPL: 0.9 G/DL
ALP SERPL-CCNC: 79 U/L (ref 39–117)
ALT SERPL W P-5'-P-CCNC: 20 U/L (ref 1–33)
ANION GAP SERPL CALCULATED.3IONS-SCNC: 11.1 MMOL/L
AORTIC DIMENSIONLESS INDEX: 0.9 (DI)
ASCENDING AORTA: 2.8 CM
AST SERPL-CCNC: 13 U/L (ref 1–32)
BH CV ECHO MEAS - ACS: 2.1 CM
BH CV ECHO MEAS - AO MEAN PG (FULL): 1 MMHG
BH CV ECHO MEAS - AO MEAN PG: 3 MMHG
BH CV ECHO MEAS - AO ROOT AREA (BSA CORRECTED): 1.8
BH CV ECHO MEAS - AO ROOT AREA: 7.8 CM^2
BH CV ECHO MEAS - AO ROOT DIAM: 3.2 CM
BH CV ECHO MEAS - AO V2 MAX: 123 CM/SEC
BH CV ECHO MEAS - AO V2 MEAN: 84.7 CM/SEC
BH CV ECHO MEAS - AO V2 VTI: 19.4 CM
BH CV ECHO MEAS - ASC AORTA: 2.8 CM
BH CV ECHO MEAS - AVA(I,A): 3.3 CM^2
BH CV ECHO MEAS - AVA(I,D): 3.3 CM^2
BH CV ECHO MEAS - BSA(HAYCOCK): 1.8 M^2
BH CV ECHO MEAS - BSA: 1.8 M^2
BH CV ECHO MEAS - BZI_BMI: 27.5 KILOGRAMS/M^2
BH CV ECHO MEAS - BZI_METRIC_HEIGHT: 162.6 CM
BH CV ECHO MEAS - BZI_METRIC_WEIGHT: 72.6 KG
BH CV ECHO MEAS - CONTRAST EF (2CH): 53.7 ML/M^2
BH CV ECHO MEAS - CONTRAST EF 4CH: 61.1 ML/M^2
BH CV ECHO MEAS - EDV(CUBED): 50.7 ML
BH CV ECHO MEAS - EDV(MOD-SP2): 54 ML
BH CV ECHO MEAS - EDV(MOD-SP4): 90 ML
BH CV ECHO MEAS - EDV(TEICH): 58.1 ML
BH CV ECHO MEAS - EF(CUBED): 84.2 %
BH CV ECHO MEAS - EF(MOD-BP): 60 %
BH CV ECHO MEAS - EF(MOD-SP2): 53.7 %
BH CV ECHO MEAS - EF(MOD-SP4): 61.1 %
BH CV ECHO MEAS - EF(TEICH): 78.1 %
BH CV ECHO MEAS - ESV(CUBED): 8 ML
BH CV ECHO MEAS - ESV(MOD-SP2): 25 ML
BH CV ECHO MEAS - ESV(MOD-SP4): 35 ML
BH CV ECHO MEAS - ESV(TEICH): 12.7 ML
BH CV ECHO MEAS - FS: 45.9 %
BH CV ECHO MEAS - IVS/LVPW: 0.91
BH CV ECHO MEAS - IVSD: 1 CM
BH CV ECHO MEAS - LAT PEAK E' VEL: 7 CM/SEC
BH CV ECHO MEAS - LV DIASTOLIC VOL/BSA (35-75): 50.6 ML/M^2
BH CV ECHO MEAS - LV MASS(C)D: 120.8 GRAMS
BH CV ECHO MEAS - LV MASS(C)DI: 67.9 GRAMS/M^2
BH CV ECHO MEAS - LV MEAN PG: 2 MMHG
BH CV ECHO MEAS - LV SYSTOLIC VOL/BSA (12-30): 19.7 ML/M^2
BH CV ECHO MEAS - LV V1 MAX: 91 CM/SEC
BH CV ECHO MEAS - LV V1 MEAN: 66.2 CM/SEC
BH CV ECHO MEAS - LV V1 VTI: 18.3 CM
BH CV ECHO MEAS - LVIDD: 3.7 CM
BH CV ECHO MEAS - LVIDS: 2 CM
BH CV ECHO MEAS - LVLD AP2: 6.3 CM
BH CV ECHO MEAS - LVLD AP4: 7 CM
BH CV ECHO MEAS - LVLS AP2: 6 CM
BH CV ECHO MEAS - LVLS AP4: 5.8 CM
BH CV ECHO MEAS - LVOT AREA (M): 3.5 CM^2
BH CV ECHO MEAS - LVOT AREA: 3.5 CM^2
BH CV ECHO MEAS - LVOT DIAM: 2.1 CM
BH CV ECHO MEAS - LVPWD: 1.1 CM
BH CV ECHO MEAS - MED PEAK E' VEL: 6 CM/SEC
BH CV ECHO MEAS - MV A DUR: 0.11 SEC
BH CV ECHO MEAS - MV A MAX VEL: 109 CM/SEC
BH CV ECHO MEAS - MV DEC SLOPE: 424 CM/SEC^2
BH CV ECHO MEAS - MV DEC TIME: 0.18 SEC
BH CV ECHO MEAS - MV E MAX VEL: 71.3 CM/SEC
BH CV ECHO MEAS - MV E/A: 0.65
BH CV ECHO MEAS - MV MEAN PG: 3 MMHG
BH CV ECHO MEAS - MV P1/2T MAX VEL: 78.5 CM/SEC
BH CV ECHO MEAS - MV P1/2T: 54.2 MSEC
BH CV ECHO MEAS - MV V2 MEAN: 77 CM/SEC
BH CV ECHO MEAS - MV V2 VTI: 26.3 CM
BH CV ECHO MEAS - MVA P1/2T LCG: 2.8 CM^2
BH CV ECHO MEAS - MVA(P1/2T): 4.1 CM^2
BH CV ECHO MEAS - MVA(VTI): 2.4 CM^2
BH CV ECHO MEAS - PA ACC SLOPE: 42.8 CM/SEC^2
BH CV ECHO MEAS - PA ACC TIME: 0.06 SEC
BH CV ECHO MEAS - PA MAX PG: 3.8 MMHG
BH CV ECHO MEAS - PA PR(ACCEL): 50.7 MMHG
BH CV ECHO MEAS - PA V2 MAX: 97.7 CM/SEC
BH CV ECHO MEAS - PULM A REVS DUR: 0.1 SEC
BH CV ECHO MEAS - PULM A REVS VEL: 34.6 CM/SEC
BH CV ECHO MEAS - PULM DIAS VEL: 42 CM/SEC
BH CV ECHO MEAS - PULM S/D: 1.6
BH CV ECHO MEAS - PULM SYS VEL: 65.6 CM/SEC
BH CV ECHO MEAS - QP/QS: 0.89
BH CV ECHO MEAS - RAP SYSTOLE: 3 MMHG
BH CV ECHO MEAS - RV MEAN PG: 2 MMHG
BH CV ECHO MEAS - RV V1 MEAN: 69.9 CM/SEC
BH CV ECHO MEAS - RV V1 VTI: 14.9 CM
BH CV ECHO MEAS - RVOT AREA: 3.8 CM^2
BH CV ECHO MEAS - RVOT DIAM: 2.2 CM
BH CV ECHO MEAS - RVSP: 21 MMHG
BH CV ECHO MEAS - SI(AO): 85 ML/M^2
BH CV ECHO MEAS - SI(CUBED): 24 ML/M^2
BH CV ECHO MEAS - SI(LVOT): 35.6 ML/M^2
BH CV ECHO MEAS - SI(MOD-SP2): 16.3 ML/M^2
BH CV ECHO MEAS - SI(MOD-SP4): 30.9 ML/M^2
BH CV ECHO MEAS - SI(TEICH): 25.5 ML/M^2
BH CV ECHO MEAS - SUP REN AO DIAM: 1.8 CM
BH CV ECHO MEAS - SV(AO): 151.2 ML
BH CV ECHO MEAS - SV(CUBED): 42.7 ML
BH CV ECHO MEAS - SV(LVOT): 63.4 ML
BH CV ECHO MEAS - SV(MOD-SP2): 29 ML
BH CV ECHO MEAS - SV(MOD-SP4): 55 ML
BH CV ECHO MEAS - SV(RVOT): 56.6 ML
BH CV ECHO MEAS - SV(TEICH): 45.4 ML
BH CV ECHO MEAS - TAPSE (>1.6): 2.4 CM2
BH CV ECHO MEAS - TR MAX VEL: 214 CM/SEC
BH CV ECHO MEASUREMENTS AVERAGE E/E' RATIO: 10.97
BH CV LOW VAS LEFT GASTRONEMIUS VESSEL: 1
BH CV LOW VAS LEFT PERONEAL VESSEL: 1
BH CV LOW VAS LEFT POSTERIOR TIBIAL VESSEL: 1
BH CV LOW VAS RIGHT GASTRONEMIUS VESSEL: 1
BH CV LOW VAS RIGHT LESSER SAPH VESSEL: 1
BH CV LOW VAS RIGHT PERONEAL VESSEL: 1
BH CV LOW VAS RIGHT POPLITEAL SPONT: 1
BH CV LOW VAS RIGHT POSTERIOR TIBIAL VESSEL: 1
BH CV LOWER VASCULAR LEFT COMMON FEMORAL AUGMENT: NORMAL
BH CV LOWER VASCULAR LEFT COMMON FEMORAL COMPETENT: NORMAL
BH CV LOWER VASCULAR LEFT COMMON FEMORAL COMPRESS: NORMAL
BH CV LOWER VASCULAR LEFT COMMON FEMORAL PHASIC: NORMAL
BH CV LOWER VASCULAR LEFT COMMON FEMORAL SPONT: NORMAL
BH CV LOWER VASCULAR LEFT DISTAL FEMORAL COMPRESS: NORMAL
BH CV LOWER VASCULAR LEFT GASTRONEMIUS COMPRESS: NORMAL
BH CV LOWER VASCULAR LEFT GASTRONEMIUS THROMBUS: NORMAL
BH CV LOWER VASCULAR LEFT GREATER SAPH AK COMPRESS: NORMAL
BH CV LOWER VASCULAR LEFT GREATER SAPH BK COMPRESS: NORMAL
BH CV LOWER VASCULAR LEFT LESSER SAPH COMPRESS: NORMAL
BH CV LOWER VASCULAR LEFT MID FEMORAL AUGMENT: NORMAL
BH CV LOWER VASCULAR LEFT MID FEMORAL COMPETENT: NORMAL
BH CV LOWER VASCULAR LEFT MID FEMORAL COMPRESS: NORMAL
BH CV LOWER VASCULAR LEFT MID FEMORAL PHASIC: NORMAL
BH CV LOWER VASCULAR LEFT MID FEMORAL SPONT: NORMAL
BH CV LOWER VASCULAR LEFT PERONEAL COMPRESS: NORMAL
BH CV LOWER VASCULAR LEFT PERONEAL THROMBUS: NORMAL
BH CV LOWER VASCULAR LEFT POPLITEAL AUGMENT: NORMAL
BH CV LOWER VASCULAR LEFT POPLITEAL COMPETENT: NORMAL
BH CV LOWER VASCULAR LEFT POPLITEAL COMPRESS: NORMAL
BH CV LOWER VASCULAR LEFT POPLITEAL PHASIC: NORMAL
BH CV LOWER VASCULAR LEFT POPLITEAL SPONT: NORMAL
BH CV LOWER VASCULAR LEFT POSTERIOR TIBIAL COMPRESS: NORMAL
BH CV LOWER VASCULAR LEFT POSTERIOR TIBIAL THROMBUS: NORMAL
BH CV LOWER VASCULAR LEFT PROXIMAL FEMORAL COMPRESS: NORMAL
BH CV LOWER VASCULAR LEFT SAPHENOFEMORAL JUNCTION AUGMENT: NORMAL
BH CV LOWER VASCULAR LEFT SAPHENOFEMORAL JUNCTION COMPETENT: NORMAL
BH CV LOWER VASCULAR LEFT SAPHENOFEMORAL JUNCTION COMPRESS: NORMAL
BH CV LOWER VASCULAR LEFT SAPHENOFEMORAL JUNCTION PHASIC: NORMAL
BH CV LOWER VASCULAR LEFT SAPHENOFEMORAL JUNCTION SPONT: NORMAL
BH CV LOWER VASCULAR RIGHT COMMON FEMORAL AUGMENT: NORMAL
BH CV LOWER VASCULAR RIGHT COMMON FEMORAL COMPETENT: NORMAL
BH CV LOWER VASCULAR RIGHT COMMON FEMORAL COMPRESS: NORMAL
BH CV LOWER VASCULAR RIGHT COMMON FEMORAL PHASIC: NORMAL
BH CV LOWER VASCULAR RIGHT COMMON FEMORAL SPONT: NORMAL
BH CV LOWER VASCULAR RIGHT DISTAL FEMORAL COMPRESS: NORMAL
BH CV LOWER VASCULAR RIGHT GASTRONEMIUS COMPRESS: NORMAL
BH CV LOWER VASCULAR RIGHT GASTRONEMIUS THROMBUS: NORMAL
BH CV LOWER VASCULAR RIGHT GREATER SAPH AK COMPRESS: NORMAL
BH CV LOWER VASCULAR RIGHT GREATER SAPH BK COMPRESS: NORMAL
BH CV LOWER VASCULAR RIGHT LESSER SAPH COMPRESS: NORMAL
BH CV LOWER VASCULAR RIGHT LESSER SAPH THROMBUS: NORMAL
BH CV LOWER VASCULAR RIGHT MID FEMORAL AUGMENT: NORMAL
BH CV LOWER VASCULAR RIGHT MID FEMORAL COMPETENT: NORMAL
BH CV LOWER VASCULAR RIGHT MID FEMORAL COMPRESS: NORMAL
BH CV LOWER VASCULAR RIGHT MID FEMORAL PHASIC: NORMAL
BH CV LOWER VASCULAR RIGHT MID FEMORAL SPONT: NORMAL
BH CV LOWER VASCULAR RIGHT PERONEAL COMPRESS: NORMAL
BH CV LOWER VASCULAR RIGHT PERONEAL THROMBUS: NORMAL
BH CV LOWER VASCULAR RIGHT POPLITEAL AUGMENT: NORMAL
BH CV LOWER VASCULAR RIGHT POPLITEAL COMPETENT: NORMAL
BH CV LOWER VASCULAR RIGHT POPLITEAL COMPRESS: NORMAL
BH CV LOWER VASCULAR RIGHT POPLITEAL PHASIC: NORMAL
BH CV LOWER VASCULAR RIGHT POPLITEAL SPONT: NORMAL
BH CV LOWER VASCULAR RIGHT POPLITEAL THROMBUS: NORMAL
BH CV LOWER VASCULAR RIGHT POSTERIOR TIBIAL COMPRESS: NORMAL
BH CV LOWER VASCULAR RIGHT POSTERIOR TIBIAL THROMBUS: NORMAL
BH CV LOWER VASCULAR RIGHT PROXIMAL FEMORAL COMPRESS: NORMAL
BH CV LOWER VASCULAR RIGHT SAPHENOFEMORAL JUNCTION AUGMENT: NORMAL
BH CV LOWER VASCULAR RIGHT SAPHENOFEMORAL JUNCTION COMPRESS: NORMAL
BH CV LOWER VASCULAR RIGHT SAPHENOFEMORAL JUNCTION PHASIC: NORMAL
BH CV LOWER VASCULAR RIGHT SAPHENOFEMORAL JUNCTION SPONT: NORMAL
BH CV VAS BP RIGHT ARM: NORMAL MMHG
BH CV XLRA - RV BASE: 3.9 CM
BH CV XLRA - TDI S': 13.4 CM/SEC
BILIRUB SERPL-MCNC: 0.4 MG/DL (ref 0.1–1.2)
BUN BLD-MCNC: 20 MG/DL (ref 8–23)
BUN/CREAT SERPL: 20.4 (ref 7–25)
CALCIUM SPEC-SCNC: 9.1 MG/DL (ref 8.6–10.5)
CHLORIDE SERPL-SCNC: 100 MMOL/L (ref 98–107)
CO2 SERPL-SCNC: 25.9 MMOL/L (ref 22–29)
CREAT BLD-MCNC: 0.98 MG/DL (ref 0.57–1)
GFR SERPL CREATININE-BSD FRML MDRD: 55 ML/MIN/1.73
GLOBULIN UR ELPH-MCNC: 3.2 GM/DL
GLUCOSE BLD-MCNC: 96 MG/DL (ref 65–99)
GLUCOSE BLDC GLUCOMTR-MCNC: 130 MG/DL (ref 70–130)
GLUCOSE BLDC GLUCOMTR-MCNC: 326 MG/DL (ref 70–130)
GLUCOSE BLDC GLUCOMTR-MCNC: 398 MG/DL (ref 70–130)
LEFT ATRIUM VOLUME INDEX: 17 ML/M2
MAXIMAL PREDICTED HEART RATE: 143 BPM
POTASSIUM BLD-SCNC: 4.2 MMOL/L (ref 3.5–5.2)
PROT SERPL-MCNC: 6.1 G/DL (ref 6–8.5)
SODIUM BLD-SCNC: 137 MMOL/L (ref 136–145)
STRESS TARGET HR: 122 BPM

## 2018-05-23 PROCEDURE — 80053 COMPREHEN METABOLIC PANEL: CPT | Performed by: INTERNAL MEDICINE

## 2018-05-23 PROCEDURE — 93970 EXTREMITY STUDY: CPT

## 2018-05-23 PROCEDURE — 82962 GLUCOSE BLOOD TEST: CPT

## 2018-05-23 PROCEDURE — 63710000001 INSULIN ASPART PER 5 UNITS: Performed by: INTERNAL MEDICINE

## 2018-05-23 RX ORDER — LEVETIRACETAM 100 MG/ML
500 SOLUTION ORAL EVERY 12 HOURS SCHEDULED
Qty: 473 ML | Refills: 0 | Status: SHIPPED | OUTPATIENT
Start: 2018-05-23

## 2018-05-23 RX ORDER — QUETIAPINE FUMARATE 25 MG/1
25 TABLET, FILM COATED ORAL EVERY 12 HOURS SCHEDULED
Qty: 60 TABLET | Refills: 0 | Status: SHIPPED | OUTPATIENT
Start: 2018-05-23

## 2018-05-23 RX ORDER — ATORVASTATIN CALCIUM 10 MG/1
10 TABLET, FILM COATED ORAL NIGHTLY
Qty: 30 TABLET | Refills: 0 | Status: SHIPPED | OUTPATIENT
Start: 2018-05-23

## 2018-05-23 RX ORDER — LORAZEPAM 0.5 MG/1
0.5 TABLET ORAL 3 TIMES DAILY
Qty: 12 TABLET | Refills: 0 | Status: SHIPPED | OUTPATIENT
Start: 2018-05-23

## 2018-05-23 RX ADMIN — LEVETIRACETAM 500 MG: 100 SOLUTION ORAL at 09:03

## 2018-05-23 RX ADMIN — DOCUSATE SODIUM 100 MG: 100 CAPSULE, LIQUID FILLED ORAL at 09:03

## 2018-05-23 RX ADMIN — Medication 100 MG: at 09:02

## 2018-05-23 RX ADMIN — QUETIAPINE FUMARATE 25 MG: 25 TABLET, FILM COATED ORAL at 09:02

## 2018-05-23 RX ADMIN — FOLIC ACID 1 MG: 1 TABLET ORAL at 09:02

## 2018-05-23 RX ADMIN — INSULIN ASPART 12 UNITS: 100 INJECTION, SOLUTION INTRAVENOUS; SUBCUTANEOUS at 17:03

## 2018-05-23 RX ADMIN — LOSARTAN POTASSIUM 50 MG: 50 TABLET, FILM COATED ORAL at 09:02

## 2018-05-23 NOTE — PROGRESS NOTES
Case Management Discharge Note    Final Note: skilled bed at [facility]    Destination - Selection Complete     Service Request Status Selected Specialties Address Phone Number Fax Number    [facility] Selected Skilled Nursing Facility [address] [place] [address] [phone]    [facility] Pending - No Request Sent N/A [address] [facility] [facility] [place] [address] [phone]      Durable Medical Equipment     No service coordination in this encounter.      Dialysis/Infusion     No service coordination in this encounter.      Home Medical Care     No service coordination in this encounter.      Social Care     No service coordination in this encounter.        Ambulance: [facility] (5:45pm)    Final Discharge Disposition Code: 03 - skilled nursing facility (SNF)

## 2018-05-23 NOTE — TELEPHONE ENCOUNTER
Scheduling sent the referral back to me with unable to contact patient. Looks like she is going to be transferred to a nursing home soon. Do you want me to contact her POA to schedule this appt for her  CT. And do you want it the same day as she sees DR Huber

## 2018-05-23 NOTE — DISCHARGE SUMMARY
PHYSICIAN DISCHARGE SUMMARY                                                                          [facility]    Patient Identification:  Name: Angela Carney  Age: 78 y.o.  Sex: female  :  1940  MRN: [id number]  Primary Care Physician: Ezra Krishnamurthy MD    Admit date: 2018  Discharge date and time:2018   Discharged Condition: poor    Discharge Diagnoses:Active Problems:    Subdural bleeding    Intracranial hematoma    Frequent falls    Dementia       Hospital Course: Angela Carney presented to [facility]  :    SDH:  Patient is a 78-year-old female with dementia living in a nursing home with 4 falls at least recently.  She presented to [place] with a left subdural hematoma with some moderate midline shift as well as a left occipital hemorrhage.  Repeat imaging revealed a large acute left subdural.  No intervention necessary patient returned to her baseline dementia.  Follow-up with neurosurgery as below.  Neurosurgery did recommend that antiplatelet and anti-coagulation held given her subdural hematoma and substantial fall risk history.    Chest pain:  This developed some atypical chest pain.  Cardiac evaluation performed by Dr. Clarke Burger.  No recurrence since initial fall.  Evaluation per them    LE Calf DVT  She was found to have bilateral lower extremity but distal blood clots with an CI to AC.   These are calf dvts and vascular surgery was consulted for evaluation of IVC filter placement.  Given the fact that there was no propagation on subsequent scans, no IVC filter recommended.  Would recommend a follow up scan in 4-6 weeks.      Consults:   IP CONSULT TO NEUROSURGERY  IP CONSULT TO NUTRITION SERVICES  IP CONSULT TO CASE MANAGEMENT   IP CONSULT TO CARDIOLOGY  IP CONSULT TO VASCULAR SURGERY  SPEECH:  EDUCATION  The patient has been educated in the following areas:   Dysphagia  "(Swallowing Impairment) Oral Care/Hydration Modified Diet Instruction.     SLP Recommendation and Plan  SLP Swallowing Diagnosis: suspected pharyngeal dysfunction  SLP Diet Recommendation: regular textures, nectar thick liquids  Recommended Precautions and Strategies: upright posture during/after eating, small bites of food and sips of liquid, no straw  Monitor for Signs of Aspiration: yes, notify SLP if any concerns, throat clearing, right lower lobe infiltrates  Recommended Diagnostics: [facility] [place]  Criteria for Skilled Therapeutic Interventions Met: demonstrates skilled criteria  Anticipated Dischage Disposition: skilled nursing facility  Rehab Potential/Prognosis, Swallowing: good, to achieve stated therapy goals  Therapy Frequency (Swallow): PRN  Predicted Duration Therapy Intervention (Days): until discharge     Plan of Care Reviewed With: patient  Plan of Care Review  Plan of Care Reviewed With: patient  Outcome Summary: Swallow re-eval completed. OK to advance to regular foods. Cont NTL.       FINAL NSG RECS:  \"PLAN: Patient much more alert and cooperative today. She denies headache. Her conversation is not logical. She has no drift. She may have some visual field deficit but difficult to examine- poorly cooperative for this. She has difficulty finding my hand with hers. She has had 4 other unwitnessed falls on Plavix and ASA with significant SDH. She also has IPH which should have further work up with MRI once blood resolves (6 weeks). She has acute DVTs but she is not a candidate for AC and resumption of AP should be carefully considered due to her fall risk. We recommend against AP until fall risk can be mitigated. Neurology is following as well. 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 We will sign off and see in follow up in 2-3 weeks with CTH. MRI later.      I discussed the patients findings and my recommendations with family and nursing " "staff\"    Significant Diagnostic Studies:     Results from last 7 days  Lab Units 05/20/18  1652 05/17/18  0550   WBC 10*3/mm3 9.11 6.93   HEMOGLOBIN g/dL 9.7* 9.2*   PLATELETS 10*3/mm3 273 264     Results from last 7 days  Lab Units 05/23/18  0539 05/22/18  0634 05/21/18  0450 05/17/18  0550   SODIUM mmol/L 137 136 137 139   POTASSIUM mmol/L 4.2 4.4 3.9 5.3*   CHLORIDE mmol/L 100 98 100 102   CO2 mmol/L 25.9 26.7 26.9 23.3   BUN mg/dL 20 21 18 25*   CREATININE mg/dL 0.98 0.99 0.98 1.07*   GLUCOSE mg/dL 96 197* 83 178*   CALCIUM mg/dL 9.1 9.1 9.2 9.1   Estimated Creatinine Clearance: 47 mL/min (by C-G formula based on SCr of 0.98 mg/dL).    Discharge Exam:  Temp:  [97.8 °F (36.6 °C)-98.2 °F (36.8 °C)] 98.2 °F (36.8 °C)  Heart Rate:  [] 101  Resp:  [18] 18  BP: (125-156)/(67-94) 125/94  GENERAL: non toxic , mild confused  HEENT:  EOMI, nonicteric sclera, no JVD  PULMONARY:    CTAB, no wheeze, no crackles, no rhonchi, symmetric air entry  CARDIAC:  RRR no MRG, S1 S2  ABD: SNTND BS+  EXT: no c/c/e, pulses symmetric 2+ bilaterally  NEURO:  CNs II-XII intact, movement in all ext, wont follow commands  SKIN: no lesions, no rash  PSYCH: confused calm     Disposition:  Nursing Home    Patient Instructions:     Follow-up Information     Ezra Krishnamurthy MD Follow up.    Specialty:  Internal Medicine  Contact information:  [address] [place] [address] [phone]             Alfonso Huber IV, MD Follow up in 2 week(s).    Specialty:  Neurosurgery  Why:  with [facility]; our office will arrange  Contact information:  [address] [place] [id number] [phone]                      Medication Reconciliation: Please see electronically completed Med Rec.    Total time spent discharging patient including evaluation, medication reconciliation, arranging follow up, and post hospitalization instructions and education total time exceeds 30 minutes.    Signed:  Cody De Guzman MD  5/23/2018  1:54 " PM

## 2018-05-23 NOTE — PROGRESS NOTES
For venous scan today.  If there has been no propagation of thrombus she could be transferred to the nursing home.

## 2018-05-23 NOTE — PROGRESS NOTES
BLE Venous doppler study complete. Preliminary no progression of bilateral DVT from previous scan called to Kari NAVARRO

## 2018-05-23 NOTE — PROGRESS NOTES
"  Daily Progress Note.   [address]  5/22/2018    Patient:  Name:  Angela Carney  MRN:  [id number]  1940  78 y.o.  female         Interval History:  Follow up traumatic subdural hematoma with history of CVA on Plavix and aspirin.      Baseline confusion no acute events        Physical Exam:  /87   Pulse 88   Temp 98 °F (36.7 °C) (Oral)   Resp 18   Ht 162.6 cm (64\")   Wt 72.6 kg (160 lb)   SpO2 97%   BMI 27.46 kg/m²   Body mass index is 27.46 kg/m².  No intake or output data in the 24 hours ending 05/22/18 2012  GENERAL: non toxic , mild confused  HEENT:  EOMI, nonicteric sclera, no JVD  PULMONARY:    CTAB, no wheeze, no crackles, no rhonchi, symmetric air entry  CARDIAC:  RRR no MRG, S1 S2  ABD: SNTND BS+  EXT: no c/c/e, pulses symmetric 2+ bilaterally  NEURO:  CNs II-XII intact, movement in all ext, wont follow commands  SKIN: no lesions, no rash  PSYCH: confused calm    Data Review:  Notable Labs:    Results from last 7 days  Lab Units 05/20/18  1652 05/17/18  0550   WBC 10*3/mm3 9.11 6.93   HEMOGLOBIN g/dL 9.7* 9.2*   PLATELETS 10*3/mm3 273 264       Results from last 7 days  Lab Units 05/22/18  0634 05/21/18  0450 05/17/18  0550   SODIUM mmol/L 136 137 139   POTASSIUM mmol/L 4.4 3.9 5.3*   CHLORIDE mmol/L 98 100 102   CO2 mmol/L 26.7 26.9 23.3   BUN mg/dL 21 18 25*   CREATININE mg/dL 0.99 0.98 1.07*   GLUCOSE mg/dL 197* 83 178*   CALCIUM mg/dL 9.1 9.2 9.1   Estimated Creatinine Clearance: 46.5 mL/min (by C-G formula based on SCr of 0.99 mg/dL).    Imaging:  dvt reviewed    Scheduled meds:      atorvastatin 10 mg Oral Nightly   docusate sodium 100 mg Oral Daily   folic acid 1 mg Oral Daily   insulin aspart 0-14 Units Subcutaneous 4x Daily With Meals & Nightly   levETIRAcetam 500 mg Oral Q12H   losartan 50 mg Oral Daily   QUEtiapine 25 mg Oral Q12H   thiamine 100 mg Oral Daily       ASSESSMENT  /  PLAN:  Traumatic subdural hematoma  Status post fall  Altered mental " status  History of CVA on Plavix and aspirin  BLE DVT  Diabetes mellitus  Elevated LFTs  Hypertension  Dementia     PLAN:    Need follow up duplex in 48-72 hours per vascular surg recs - schedule tomorrow am then dc in afternoon if no propogation  apprec cards  Ambulate with physical therapy as possible     Cody De Guzman MD  [facility]  05/22/18  7845

## 2018-05-24 NOTE — TELEPHONE ENCOUNTER
Spoke with Kita @ Scheduling the only opening they have for a CT on 6/4 is 7 am and pt's appt is not until 3 pm.  Please advise

## 2018-05-29 NOTE — TELEPHONE ENCOUNTER
Pt's daughter Felicity LACEY called and said that  the nursing home in [place] would not keep the patient.  She has been getting violent because of her dementia. She has now been transferred to psychiatric hospital in [place]. She will call us when she is able to get her back here.   Pt is scheduled for her CT and Office visit on 6/4. Do you want me to cancel both     Please advise

## 2018-06-04 ENCOUNTER — APPOINTMENT (OUTPATIENT)
Dept: CT IMAGING | Facility: HOSPITAL | Age: 78
End: 2018-06-04

## 2020-10-30 NOTE — PROGRESS NOTES
Continued Stay Note   [facility]     Patient Name: Angela Carney  MRN: [id number]  Today's Date: 5/22/2018    Admit Date: 5/16/2018          Discharge Plan     Row Name 05/22/18 1740       Plan    Plan Skilled bed at [facility]    Plan Comments Patient's RN informed [facility] that patient would not be d/c today.  Voicemail message left for [facility] to confirm bed availability for the patient tomorrow.  MICHEAL Villegas              Discharge Codes    No documentation.       Expected Discharge Date and Time     Expected Discharge Date Expected Discharge Time    May 22, 2018             MICHEAL Knight     No